# Patient Record
Sex: FEMALE | Race: BLACK OR AFRICAN AMERICAN | NOT HISPANIC OR LATINO | Employment: OTHER | RURAL
[De-identification: names, ages, dates, MRNs, and addresses within clinical notes are randomized per-mention and may not be internally consistent; named-entity substitution may affect disease eponyms.]

---

## 2020-03-24 ENCOUNTER — HISTORICAL (OUTPATIENT)
Dept: ADMINISTRATIVE | Facility: HOSPITAL | Age: 40
End: 2020-03-24

## 2020-03-24 LAB
ALBUMIN SERPL BCP-MCNC: 3.8 G/DL (ref 3.5–5)
ALP SERPL-CCNC: 94 U/L (ref 37–98)
ALT SERPL W P-5'-P-CCNC: 39 U/L (ref 13–56)
AST SERPL W P-5'-P-CCNC: 23 U/L (ref 15–37)
BASOPHILS # BLD AUTO: 0.04 X10E3/UL (ref 0–0.2)
BASOPHILS NFR BLD AUTO: 0.3 % (ref 0–1)
BILIRUB DIRECT SERPL-MCNC: 0.2 MG/DL (ref 0–0.2)
BILIRUB SERPL-MCNC: 0.5 MG/DL (ref 0–1.2)
BUN SERPL-MCNC: 9 MG/DL (ref 7–18)
CALCIUM SERPL-MCNC: 9.2 MG/DL (ref 8.5–10.1)
CHLORIDE SERPL-SCNC: 106 MMOL/L (ref 98–107)
CO2 SERPL-SCNC: 32 MMOL/L (ref 21–32)
CREAT SERPL-MCNC: 1.05 MG/DL (ref 0.5–1.02)
EOSINOPHIL # BLD AUTO: 0.32 X10E3/UL (ref 0–0.5)
EOSINOPHIL NFR BLD AUTO: 2.7 % (ref 1–4)
ERYTHROCYTE [DISTWIDTH] IN BLOOD BY AUTOMATED COUNT: 13.3 % (ref 11.5–14.5)
FLUAV AG UPPER RESP QL IA.RAPID: NEGATIVE
FLUBV AG UPPER RESP QL IA.RAPID: NEGATIVE
GLUCOSE SERPL-MCNC: 96 MG/DL (ref 74–106)
HCT VFR BLD AUTO: 46.7 % (ref 38–47)
HGB BLD-MCNC: 15.4 G/DL (ref 12–16)
IMM GRANULOCYTES # BLD AUTO: 0.09 X10E3/UL (ref 0–0.04)
IMM GRANULOCYTES NFR BLD: 0.8 % (ref 0–0.4)
LYMPHOCYTES # BLD AUTO: 2.89 X10E3/UL (ref 1–4.8)
LYMPHOCYTES NFR BLD AUTO: 24.2 % (ref 27–41)
MCH RBC QN AUTO: 28.5 PG (ref 27–31)
MCHC RBC AUTO-ENTMCNC: 33 G/DL (ref 32–36)
MCV RBC AUTO: 86.3 FL (ref 80–96)
MONOCYTES # BLD AUTO: 1.42 X10E3/UL (ref 0–0.8)
MONOCYTES NFR BLD AUTO: 11.9 % (ref 2–6)
MPC BLD CALC-MCNC: 10.3 FL (ref 9.4–12.4)
NEUTROPHILS # BLD AUTO: 7.2 X10E3/UL (ref 1.8–7.7)
NEUTROPHILS NFR BLD AUTO: 60.1 % (ref 53–65)
NRBC # BLD AUTO: 0 X10E3/UL (ref 0–0)
NRBC, AUTO (.00): 0 /100 (ref 0–0)
NT-PROBNP SERPL-MCNC: 57 PG/ML (ref 1–125)
PLATELET # BLD AUTO: 393 X10E3/UL (ref 150–400)
POTASSIUM SERPL-SCNC: 3.5 MMOL/L (ref 3.5–5.1)
PROT SERPL-MCNC: 7.6 G/DL (ref 6.4–8.2)
RAPID GROUP A STREP ANTIGEN: NEGATIVE
RBC # BLD AUTO: 5.41 X10E6/UL (ref 4.2–5.4)
SODIUM SERPL-SCNC: 141 MMOL/L (ref 136–145)
WBC # BLD AUTO: 11.96 X10E3/UL (ref 4.5–11)

## 2020-04-21 ENCOUNTER — HISTORICAL (OUTPATIENT)
Dept: ADMINISTRATIVE | Facility: HOSPITAL | Age: 40
End: 2020-04-21

## 2020-04-21 LAB
FLUAV AG UPPER RESP QL IA.RAPID: NEGATIVE
FLUBV AG UPPER RESP QL IA.RAPID: NEGATIVE
RAPID GROUP A STREP ANTIGEN: NEGATIVE

## 2020-05-04 ENCOUNTER — HISTORICAL (OUTPATIENT)
Dept: ADMINISTRATIVE | Facility: HOSPITAL | Age: 40
End: 2020-05-04

## 2020-05-04 LAB
ALBUMIN SERPL BCP-MCNC: 3.3 G/DL (ref 3.5–5)
ALP SERPL-CCNC: 100 U/L (ref 37–98)
ALT SERPL W P-5'-P-CCNC: 34 U/L (ref 13–56)
AMYLASE SERPL-CCNC: 61 U/L (ref 25–115)
AST SERPL W P-5'-P-CCNC: 40 U/L (ref 15–37)
BASOPHILS # BLD AUTO: 0.03 X10E3/UL (ref 0–0.2)
BASOPHILS NFR BLD AUTO: 0.4 % (ref 0–1)
BILIRUB DIRECT SERPL-MCNC: 0.1 MG/DL (ref 0–0.2)
BILIRUB SERPL-MCNC: 0.4 MG/DL (ref 0–1.2)
BUN SERPL-MCNC: 8 MG/DL (ref 7–18)
CALCIUM SERPL-MCNC: 8.9 MG/DL (ref 8.5–10.1)
CHLORIDE SERPL-SCNC: 102 MMOL/L (ref 98–107)
CO2 SERPL-SCNC: 30 MMOL/L (ref 21–32)
CREAT SERPL-MCNC: 1.03 MG/DL (ref 0.5–1.02)
EOSINOPHIL # BLD AUTO: 0.03 X10E3/UL (ref 0–0.5)
EOSINOPHIL NFR BLD AUTO: 0.4 % (ref 1–4)
ERYTHROCYTE [DISTWIDTH] IN BLOOD BY AUTOMATED COUNT: 13.2 % (ref 11.5–14.5)
GLUCOSE SERPL-MCNC: 105 MG/DL (ref 74–106)
HCT VFR BLD AUTO: 45.7 % (ref 38–47)
HGB BLD-MCNC: 15 G/DL (ref 12–16)
IMM GRANULOCYTES # BLD AUTO: 0.11 X10E3/UL (ref 0–0.04)
IMM GRANULOCYTES NFR BLD: 1.3 % (ref 0–0.4)
LIPASE SERPL-CCNC: 248 U/L (ref 73–393)
LYMPHOCYTES # BLD AUTO: 1.92 X10E3/UL (ref 1–4.8)
LYMPHOCYTES NFR BLD AUTO: 22.5 % (ref 27–41)
MCH RBC QN AUTO: 27.6 PG (ref 27–31)
MCHC RBC AUTO-ENTMCNC: 32.8 G/DL (ref 32–36)
MCV RBC AUTO: 84 FL (ref 80–96)
MONOCYTES # BLD AUTO: 0.9 X10E3/UL (ref 0–0.8)
MONOCYTES NFR BLD AUTO: 10.5 % (ref 2–6)
MPC BLD CALC-MCNC: 10 FL (ref 9.4–12.4)
NEUTROPHILS # BLD AUTO: 5.56 X10E3/UL (ref 1.8–7.7)
NEUTROPHILS NFR BLD AUTO: 64.9 % (ref 53–65)
NRBC # BLD AUTO: 0 X10E3/UL (ref 0–0)
NRBC, AUTO (.00): 0 /100 (ref 0–0)
PLATELET # BLD AUTO: 391 X10E3/UL (ref 150–400)
POTASSIUM SERPL-SCNC: 3.3 MMOL/L (ref 3.5–5.1)
PROT SERPL-MCNC: 7.3 G/DL (ref 6.4–8.2)
RBC # BLD AUTO: 5.44 X10E6/UL (ref 4.2–5.4)
SODIUM SERPL-SCNC: 142 MMOL/L (ref 136–145)
WBC # BLD AUTO: 8.55 X10E3/UL (ref 4.5–11)

## 2020-05-27 ENCOUNTER — HISTORICAL (OUTPATIENT)
Dept: ADMINISTRATIVE | Facility: HOSPITAL | Age: 40
End: 2020-05-27

## 2020-07-07 ENCOUNTER — HISTORICAL (OUTPATIENT)
Dept: ADMINISTRATIVE | Facility: HOSPITAL | Age: 40
End: 2020-07-07

## 2020-07-07 LAB
A1AT SERPL NEPH-MCNC: 168 MG/DL (ref 90–200)
ANION GAP SERPL CALCULATED.3IONS-SCNC: 11 MMOL/L (ref 7–16)
BASOPHILS # BLD AUTO: 0.02 X10E3/UL (ref 0–0.2)
BASOPHILS NFR BLD AUTO: 0.2 % (ref 0–1)
BUN SERPL-MCNC: 9 MG/DL (ref 7–18)
CALCIUM SERPL-MCNC: 9.6 MG/DL (ref 8.5–10.1)
CHLORIDE SERPL-SCNC: 104 MMOL/L (ref 98–107)
CO2 SERPL-SCNC: 29 MMOL/L (ref 21–32)
CREAT SERPL-MCNC: 0.89 MG/DL (ref 0.5–1.02)
D DIMER PPP FEU-MCNC: 0.29 UG/ML (ref 0–0.47)
EOSINOPHIL # BLD AUTO: 0.23 X10E3/UL (ref 0–0.5)
EOSINOPHIL NFR BLD AUTO: 2.7 % (ref 1–4)
ERYTHROCYTE [DISTWIDTH] IN BLOOD BY AUTOMATED COUNT: 13.8 % (ref 11.5–14.5)
GLUCOSE SERPL-MCNC: 86 MG/DL (ref 74–106)
HCT VFR BLD AUTO: 42.1 % (ref 38–47)
HGB BLD-MCNC: 13.9 G/DL (ref 12–16)
IMM GRANULOCYTES # BLD AUTO: 0.05 X10E3/UL (ref 0–0.04)
IMM GRANULOCYTES NFR BLD: 0.6 % (ref 0–0.4)
LYMPHOCYTES # BLD AUTO: 2.84 X10E3/UL (ref 1–4.8)
LYMPHOCYTES NFR BLD AUTO: 33.4 % (ref 27–41)
MCH RBC QN AUTO: 27.8 PG (ref 27–31)
MCHC RBC AUTO-ENTMCNC: 33 G/DL (ref 32–36)
MCV RBC AUTO: 84.2 FL (ref 80–96)
MONOCYTES # BLD AUTO: 1.26 X10E3/UL (ref 0–0.8)
MONOCYTES NFR BLD AUTO: 14.8 % (ref 2–6)
MPC BLD CALC-MCNC: 10.2 FL (ref 9.4–12.4)
NEUTROPHILS # BLD AUTO: 4.1 X10E3/UL (ref 1.8–7.7)
NEUTROPHILS NFR BLD AUTO: 48.3 % (ref 53–65)
NRBC # BLD AUTO: 0 X10E3/UL (ref 0–0)
NRBC, AUTO (.00): 0 /100 (ref 0–0)
PLATELET # BLD AUTO: 488 X10E3/UL (ref 150–400)
POTASSIUM SERPL-SCNC: 3.5 MMOL/L (ref 3.5–5.1)
RBC # BLD AUTO: 5 X10E6/UL (ref 4.2–5.4)
SODIUM SERPL-SCNC: 140 MMOL/L (ref 136–145)
TSH SERPL DL<=0.005 MIU/L-ACNC: 0.64 UIU/ML (ref 0.36–3.74)
WBC # BLD AUTO: 8.5 X10E3/UL (ref 4.5–11)

## 2020-10-20 LAB — PAP RECOMMENDATION EXT: NORMAL

## 2020-11-25 ENCOUNTER — HISTORICAL (OUTPATIENT)
Dept: ADMINISTRATIVE | Facility: HOSPITAL | Age: 40
End: 2020-11-25

## 2020-11-25 LAB
BACTERIA #/AREA URNS HPF: ABNORMAL /HPF
BILIRUB UR QL STRIP: NEGATIVE MG/DL
CLARITY UR: CLEAR
CLARITY UR: CLEAR
COLOR UR: YELLOW
COLOR UR: YELLOW
GLUCOSE UR STRIP-MCNC: NORMAL MG/DL
HCG UR QL IA.RAPID: NEGATIVE
KETONES UR STRIP-SCNC: NEGATIVE MG/DL
LEUKOCYTE ESTERASE UR QL STRIP: ABNORMAL LEU/UL
NITRITE UR QL STRIP: NEGATIVE
PH UR STRIP: 6.5 PH UNITS (ref 5–8)
PROT UR QL STRIP: NEGATIVE MG/DL
RBC # UR STRIP: NEGATIVE ERY/UL
RBC #/AREA URNS HPF: ABNORMAL /HPF (ref 0–3)
SP GR UR STRIP: 1.02 (ref 1–1.03)
SQUAMOUS #/AREA URNS LPF: ABNORMAL /LPF
UROBILINOGEN UR STRIP-ACNC: 0.2 MG/DL
WBC #/AREA URNS HPF: ABNORMAL /HPF (ref 0–5)

## 2020-12-28 ENCOUNTER — HISTORICAL (OUTPATIENT)
Dept: ADMINISTRATIVE | Facility: HOSPITAL | Age: 40
End: 2020-12-28

## 2021-04-05 ENCOUNTER — HISTORICAL (OUTPATIENT)
Dept: ADMINISTRATIVE | Facility: HOSPITAL | Age: 41
End: 2021-04-05

## 2021-04-19 ENCOUNTER — HISTORICAL (OUTPATIENT)
Dept: ADMINISTRATIVE | Facility: HOSPITAL | Age: 41
End: 2021-04-19

## 2021-04-29 RX ORDER — ONDANSETRON 4 MG/1
8 TABLET, FILM COATED ORAL
COMMUNITY
End: 2021-11-23 | Stop reason: CLARIF

## 2021-04-29 RX ORDER — NEBIVOLOL 10 MG/1
10 TABLET ORAL DAILY
COMMUNITY

## 2021-04-29 RX ORDER — HYDROCHLOROTHIAZIDE 12.5 MG/1
12.5 CAPSULE ORAL DAILY
COMMUNITY
End: 2021-11-23 | Stop reason: CLARIF

## 2021-04-29 RX ORDER — GUAIFENESIN 1200 MG/1
TABLET, EXTENDED RELEASE ORAL
COMMUNITY
End: 2021-11-23 | Stop reason: CLARIF

## 2021-04-29 RX ORDER — OXYCODONE AND ACETAMINOPHEN 7.5; 325 MG/1; MG/1
1 TABLET ORAL EVERY 8 HOURS
COMMUNITY
End: 2021-06-30 | Stop reason: SDUPTHER

## 2021-05-03 ENCOUNTER — OFFICE VISIT (OUTPATIENT)
Dept: PAIN MEDICINE | Facility: CLINIC | Age: 41
End: 2021-05-03
Payer: MEDICARE

## 2021-05-03 VITALS
RESPIRATION RATE: 16 BRPM | HEIGHT: 62 IN | BODY MASS INDEX: 37.73 KG/M2 | HEART RATE: 101 BPM | SYSTOLIC BLOOD PRESSURE: 153 MMHG | WEIGHT: 205 LBS | DIASTOLIC BLOOD PRESSURE: 109 MMHG

## 2021-05-03 DIAGNOSIS — M47.817 LUMBOSACRAL SPONDYLOSIS WITHOUT MYELOPATHY: Primary | Chronic | ICD-10-CM

## 2021-05-03 DIAGNOSIS — R10.84 GENERALIZED ABDOMINAL PAIN: Chronic | ICD-10-CM

## 2021-05-03 DIAGNOSIS — K86.1 IDIOPATHIC CHRONIC PANCREATITIS: Chronic | ICD-10-CM

## 2021-05-03 DIAGNOSIS — G89.4 CHRONIC PAIN SYNDROME: Chronic | ICD-10-CM

## 2021-05-03 PROCEDURE — 99215 OFFICE O/P EST HI 40 MIN: CPT | Mod: PBBFAC | Performed by: PHYSICIAN ASSISTANT

## 2021-05-03 PROCEDURE — 99214 PR OFFICE/OUTPT VISIT, EST, LEVL IV, 30-39 MIN: ICD-10-PCS | Mod: S$PBB,,, | Performed by: PHYSICIAN ASSISTANT

## 2021-05-03 PROCEDURE — 99215 OFFICE O/P EST HI 40 MIN: CPT | Mod: PBBFAC,,, | Performed by: PHYSICIAN ASSISTANT

## 2021-05-03 PROCEDURE — 99214 OFFICE O/P EST MOD 30 MIN: CPT | Mod: S$PBB,,, | Performed by: PHYSICIAN ASSISTANT

## 2021-05-03 PROCEDURE — 99215 HC OFFICE/OUTPT VISIT, EST, LEVL V, 40-54 MIN: ICD-10-PCS | Mod: PBBFAC,,, | Performed by: PHYSICIAN ASSISTANT

## 2021-05-03 RX ORDER — FLUCONAZOLE 150 MG/1
TABLET ORAL
COMMUNITY
Start: 2021-04-06 | End: 2021-11-23 | Stop reason: CLARIF

## 2021-05-03 RX ORDER — CYCLOBENZAPRINE HCL 10 MG
10 TABLET ORAL EVERY 8 HOURS
Qty: 90 TABLET | Refills: 1 | Status: SHIPPED | OUTPATIENT
Start: 2021-05-03 | End: 2021-06-30 | Stop reason: SDUPTHER

## 2021-05-03 RX ORDER — OXYCODONE AND ACETAMINOPHEN 7.5; 325 MG/1; MG/1
1 TABLET ORAL EVERY 8 HOURS
Qty: 90 TABLET | Refills: 0 | Status: SHIPPED | OUTPATIENT
Start: 2021-06-02 | End: 2021-06-30 | Stop reason: SDUPTHER

## 2021-05-03 RX ORDER — ONDANSETRON 4 MG/1
TABLET, ORALLY DISINTEGRATING ORAL
COMMUNITY
Start: 2021-04-19 | End: 2022-01-01

## 2021-05-03 RX ORDER — OXYCODONE AND ACETAMINOPHEN 7.5; 325 MG/1; MG/1
1 TABLET ORAL EVERY 8 HOURS
Qty: 90 TABLET | Refills: 0 | Status: SHIPPED | OUTPATIENT
Start: 2021-05-03 | End: 2021-06-02

## 2021-05-03 RX ORDER — METHYLPREDNISOLONE 4 MG/1
TABLET ORAL
COMMUNITY
Start: 2021-04-06 | End: 2021-11-23 | Stop reason: CLARIF

## 2021-05-03 RX ORDER — AZITHROMYCIN 250 MG/1
TABLET, FILM COATED ORAL
COMMUNITY
Start: 2021-04-06 | End: 2021-11-23 | Stop reason: CLARIF

## 2021-05-26 ENCOUNTER — OFFICE VISIT (OUTPATIENT)
Dept: PRIMARY CARE CLINIC | Facility: CLINIC | Age: 41
End: 2021-05-26
Payer: MEDICARE

## 2021-05-26 VITALS
WEIGHT: 206.63 LBS | HEIGHT: 65 IN | SYSTOLIC BLOOD PRESSURE: 124 MMHG | DIASTOLIC BLOOD PRESSURE: 80 MMHG | RESPIRATION RATE: 20 BRPM | HEART RATE: 100 BPM | BODY MASS INDEX: 34.43 KG/M2 | OXYGEN SATURATION: 92 % | TEMPERATURE: 99 F

## 2021-05-26 DIAGNOSIS — G43.809 OTHER MIGRAINE WITHOUT STATUS MIGRAINOSUS, NOT INTRACTABLE: ICD-10-CM

## 2021-05-26 DIAGNOSIS — M33.20 POLYMYOSITIS ASSOCIATED WITH AUTOIMMUNE DISEASE: Primary | ICD-10-CM

## 2021-05-26 DIAGNOSIS — M35.9 POLYMYOSITIS ASSOCIATED WITH AUTOIMMUNE DISEASE: Primary | ICD-10-CM

## 2021-05-26 PROCEDURE — 96372 THER/PROPH/DIAG INJ SC/IM: CPT | Mod: ,,, | Performed by: FAMILY MEDICINE

## 2021-05-26 PROCEDURE — 99213 PR OFFICE/OUTPT VISIT, EST, LEVL III, 20-29 MIN: ICD-10-PCS | Mod: 25,,, | Performed by: FAMILY MEDICINE

## 2021-05-26 PROCEDURE — 96372 PR INJECTION,THERAP/PROPH/DIAG2ST, IM OR SUBCUT: ICD-10-PCS | Mod: ,,, | Performed by: FAMILY MEDICINE

## 2021-05-26 PROCEDURE — 99213 OFFICE O/P EST LOW 20 MIN: CPT | Mod: 25,,, | Performed by: FAMILY MEDICINE

## 2021-05-26 RX ORDER — PROMETHAZINE HYDROCHLORIDE 25 MG/ML
25 INJECTION, SOLUTION INTRAMUSCULAR; INTRAVENOUS
Status: COMPLETED | OUTPATIENT
Start: 2021-05-26 | End: 2021-05-26

## 2021-05-26 RX ORDER — KETOROLAC TROMETHAMINE 30 MG/ML
60 INJECTION, SOLUTION INTRAMUSCULAR; INTRAVENOUS
Status: COMPLETED | OUTPATIENT
Start: 2021-05-26 | End: 2021-05-26

## 2021-05-26 RX ADMIN — KETOROLAC TROMETHAMINE 60 MG: 30 INJECTION, SOLUTION INTRAMUSCULAR; INTRAVENOUS at 09:05

## 2021-05-26 RX ADMIN — PROMETHAZINE HYDROCHLORIDE 25 MG: 25 INJECTION, SOLUTION INTRAMUSCULAR; INTRAVENOUS at 09:05

## 2021-06-02 ENCOUNTER — OFFICE VISIT (OUTPATIENT)
Dept: PRIMARY CARE CLINIC | Facility: CLINIC | Age: 41
End: 2021-06-02
Payer: MEDICARE

## 2021-06-02 VITALS
WEIGHT: 206 LBS | OXYGEN SATURATION: 94 % | SYSTOLIC BLOOD PRESSURE: 120 MMHG | BODY MASS INDEX: 37.91 KG/M2 | HEART RATE: 112 BPM | TEMPERATURE: 98 F | HEIGHT: 62 IN | DIASTOLIC BLOOD PRESSURE: 84 MMHG | RESPIRATION RATE: 20 BRPM

## 2021-06-02 DIAGNOSIS — G43.909 MIGRAINE WITHOUT STATUS MIGRAINOSUS, NOT INTRACTABLE, UNSPECIFIED MIGRAINE TYPE: ICD-10-CM

## 2021-06-02 DIAGNOSIS — M33.22 POLYMYOSITIS WITH MYOPATHY: Primary | ICD-10-CM

## 2021-06-02 PROCEDURE — 96372 THER/PROPH/DIAG INJ SC/IM: CPT | Mod: ,,, | Performed by: FAMILY MEDICINE

## 2021-06-02 PROCEDURE — 99213 OFFICE O/P EST LOW 20 MIN: CPT | Mod: 25,,, | Performed by: FAMILY MEDICINE

## 2021-06-02 PROCEDURE — 99213 PR OFFICE/OUTPT VISIT, EST, LEVL III, 20-29 MIN: ICD-10-PCS | Mod: 25,,, | Performed by: FAMILY MEDICINE

## 2021-06-02 PROCEDURE — 96372 PR INJECTION,THERAP/PROPH/DIAG2ST, IM OR SUBCUT: ICD-10-PCS | Mod: ,,, | Performed by: FAMILY MEDICINE

## 2021-06-02 RX ORDER — PROMETHAZINE HYDROCHLORIDE 25 MG/ML
25 INJECTION, SOLUTION INTRAMUSCULAR; INTRAVENOUS
Status: COMPLETED | OUTPATIENT
Start: 2021-06-02 | End: 2021-06-02

## 2021-06-02 RX ORDER — KETOROLAC TROMETHAMINE 30 MG/ML
60 INJECTION, SOLUTION INTRAMUSCULAR; INTRAVENOUS
Status: COMPLETED | OUTPATIENT
Start: 2021-06-02 | End: 2021-06-02

## 2021-06-02 RX ADMIN — KETOROLAC TROMETHAMINE 60 MG: 30 INJECTION, SOLUTION INTRAMUSCULAR; INTRAVENOUS at 09:06

## 2021-06-02 RX ADMIN — PROMETHAZINE HYDROCHLORIDE 25 MG: 25 INJECTION, SOLUTION INTRAMUSCULAR; INTRAVENOUS at 09:06

## 2021-06-08 ENCOUNTER — OFFICE VISIT (OUTPATIENT)
Dept: PRIMARY CARE CLINIC | Facility: CLINIC | Age: 41
End: 2021-06-08
Payer: MEDICARE

## 2021-06-08 VITALS
OXYGEN SATURATION: 96 % | TEMPERATURE: 98 F | BODY MASS INDEX: 37.91 KG/M2 | HEIGHT: 62 IN | HEART RATE: 101 BPM | SYSTOLIC BLOOD PRESSURE: 160 MMHG | WEIGHT: 206 LBS | RESPIRATION RATE: 20 BRPM | DIASTOLIC BLOOD PRESSURE: 100 MMHG

## 2021-06-08 DIAGNOSIS — G43.809 OTHER MIGRAINE WITHOUT STATUS MIGRAINOSUS, NOT INTRACTABLE: ICD-10-CM

## 2021-06-08 DIAGNOSIS — M33.20 POLYMYOSITIS: ICD-10-CM

## 2021-06-08 DIAGNOSIS — M19.90 ARTHRITIS: Primary | ICD-10-CM

## 2021-06-08 PROCEDURE — 96372 PR INJECTION,THERAP/PROPH/DIAG2ST, IM OR SUBCUT: ICD-10-PCS | Mod: ,,, | Performed by: FAMILY MEDICINE

## 2021-06-08 PROCEDURE — 96372 THER/PROPH/DIAG INJ SC/IM: CPT | Mod: ,,, | Performed by: FAMILY MEDICINE

## 2021-06-08 PROCEDURE — 99213 OFFICE O/P EST LOW 20 MIN: CPT | Mod: 25,,, | Performed by: FAMILY MEDICINE

## 2021-06-08 PROCEDURE — 99213 PR OFFICE/OUTPT VISIT, EST, LEVL III, 20-29 MIN: ICD-10-PCS | Mod: 25,,, | Performed by: FAMILY MEDICINE

## 2021-06-08 RX ORDER — KETOROLAC TROMETHAMINE 30 MG/ML
60 INJECTION, SOLUTION INTRAMUSCULAR; INTRAVENOUS
Status: COMPLETED | OUTPATIENT
Start: 2021-06-08 | End: 2021-06-08

## 2021-06-08 RX ORDER — PROMETHAZINE HYDROCHLORIDE 25 MG/ML
25 INJECTION, SOLUTION INTRAMUSCULAR; INTRAVENOUS
Status: COMPLETED | OUTPATIENT
Start: 2021-06-08 | End: 2021-06-08

## 2021-06-08 RX ADMIN — PROMETHAZINE HYDROCHLORIDE 25 MG: 25 INJECTION, SOLUTION INTRAMUSCULAR; INTRAVENOUS at 09:06

## 2021-06-08 RX ADMIN — KETOROLAC TROMETHAMINE 60 MG: 30 INJECTION, SOLUTION INTRAMUSCULAR; INTRAVENOUS at 09:06

## 2021-06-15 ENCOUNTER — OFFICE VISIT (OUTPATIENT)
Dept: PRIMARY CARE CLINIC | Facility: CLINIC | Age: 41
End: 2021-06-15
Payer: MEDICARE

## 2021-06-15 VITALS
RESPIRATION RATE: 20 BRPM | HEART RATE: 88 BPM | WEIGHT: 206 LBS | TEMPERATURE: 97 F | HEIGHT: 62 IN | OXYGEN SATURATION: 95 % | BODY MASS INDEX: 37.91 KG/M2 | DIASTOLIC BLOOD PRESSURE: 78 MMHG | SYSTOLIC BLOOD PRESSURE: 132 MMHG

## 2021-06-15 DIAGNOSIS — M33.22 POLYMYOSITIS WITH MYOPATHY: ICD-10-CM

## 2021-06-15 DIAGNOSIS — G43.909 MIGRAINE WITHOUT STATUS MIGRAINOSUS, NOT INTRACTABLE, UNSPECIFIED MIGRAINE TYPE: Primary | ICD-10-CM

## 2021-06-15 PROCEDURE — 96372 PR INJECTION,THERAP/PROPH/DIAG2ST, IM OR SUBCUT: ICD-10-PCS | Mod: ,,, | Performed by: FAMILY MEDICINE

## 2021-06-15 PROCEDURE — 99213 PR OFFICE/OUTPT VISIT, EST, LEVL III, 20-29 MIN: ICD-10-PCS | Mod: 25,,, | Performed by: FAMILY MEDICINE

## 2021-06-15 PROCEDURE — 96372 THER/PROPH/DIAG INJ SC/IM: CPT | Mod: ,,, | Performed by: FAMILY MEDICINE

## 2021-06-15 PROCEDURE — 99213 OFFICE O/P EST LOW 20 MIN: CPT | Mod: 25,,, | Performed by: FAMILY MEDICINE

## 2021-06-15 RX ORDER — PROMETHAZINE HYDROCHLORIDE 25 MG/ML
25 INJECTION, SOLUTION INTRAMUSCULAR; INTRAVENOUS
Status: COMPLETED | OUTPATIENT
Start: 2021-06-15 | End: 2021-06-15

## 2021-06-15 RX ORDER — KETOROLAC TROMETHAMINE 30 MG/ML
60 INJECTION, SOLUTION INTRAMUSCULAR; INTRAVENOUS
Status: COMPLETED | OUTPATIENT
Start: 2021-06-15 | End: 2021-06-15

## 2021-06-15 RX ADMIN — KETOROLAC TROMETHAMINE 60 MG: 30 INJECTION, SOLUTION INTRAMUSCULAR; INTRAVENOUS at 09:06

## 2021-06-15 RX ADMIN — PROMETHAZINE HYDROCHLORIDE 25 MG: 25 INJECTION, SOLUTION INTRAMUSCULAR; INTRAVENOUS at 09:06

## 2021-06-30 ENCOUNTER — OFFICE VISIT (OUTPATIENT)
Dept: PAIN MEDICINE | Facility: CLINIC | Age: 41
End: 2021-06-30
Payer: MEDICARE

## 2021-06-30 VITALS
RESPIRATION RATE: 18 BRPM | SYSTOLIC BLOOD PRESSURE: 139 MMHG | BODY MASS INDEX: 37.91 KG/M2 | HEART RATE: 101 BPM | HEIGHT: 62 IN | WEIGHT: 206 LBS | DIASTOLIC BLOOD PRESSURE: 87 MMHG

## 2021-06-30 DIAGNOSIS — K86.1 IDIOPATHIC CHRONIC PANCREATITIS: Primary | Chronic | ICD-10-CM

## 2021-06-30 DIAGNOSIS — Z79.899 ENCOUNTER FOR LONG-TERM (CURRENT) USE OF OTHER MEDICATIONS: ICD-10-CM

## 2021-06-30 DIAGNOSIS — M47.817 LUMBOSACRAL SPONDYLOSIS WITHOUT MYELOPATHY: Chronic | ICD-10-CM

## 2021-06-30 DIAGNOSIS — M33.22 POLYMYOSITIS WITH MYOPATHY: ICD-10-CM

## 2021-06-30 DIAGNOSIS — R10.84 GENERALIZED ABDOMINAL PAIN: Chronic | ICD-10-CM

## 2021-06-30 LAB
CTP QC/QA: YES
POC (AMP) AMPHETAMINE: NEGATIVE
POC (BAR) BARBITURATES: NEGATIVE
POC (BUP) BUPRENORPHINE: NEGATIVE
POC (BZO) BENZODIAZEPINES: NEGATIVE
POC (COC) COCAINE: NEGATIVE
POC (MDMA) METHYLENEDIOXYMETHAMPHETAMINE 3,4: NEGATIVE
POC (MET) METHAMPHETAMINE: NEGATIVE
POC (MOP) OPIATES: NEGATIVE
POC (MTD) METHADONE: NEGATIVE
POC (OXY) OXYCODONE: ABNORMAL
POC (PCP) PHENCYCLIDINE: NEGATIVE
POC (TCA) TRICYCLIC ANTIDEPRESSANTS: NEGATIVE
POC TEMPERATURE (URINE): 94
POC THC: NEGATIVE

## 2021-06-30 PROCEDURE — 80305 DRUG TEST PRSMV DIR OPT OBS: CPT | Mod: PBBFAC | Performed by: PHYSICIAN ASSISTANT

## 2021-06-30 PROCEDURE — 99214 PR OFFICE/OUTPT VISIT, EST, LEVL IV, 30-39 MIN: ICD-10-PCS | Mod: S$PBB,,, | Performed by: PHYSICIAN ASSISTANT

## 2021-06-30 PROCEDURE — 99214 OFFICE O/P EST MOD 30 MIN: CPT | Mod: PBBFAC | Performed by: PHYSICIAN ASSISTANT

## 2021-06-30 PROCEDURE — 99214 OFFICE O/P EST MOD 30 MIN: CPT | Mod: S$PBB,,, | Performed by: PHYSICIAN ASSISTANT

## 2021-06-30 RX ORDER — OXYCODONE AND ACETAMINOPHEN 7.5; 325 MG/1; MG/1
1 TABLET ORAL EVERY 8 HOURS
Qty: 90 TABLET | Refills: 0 | Status: SHIPPED | OUTPATIENT
Start: 2021-07-02 | End: 2021-08-01

## 2021-06-30 RX ORDER — CYCLOBENZAPRINE HCL 10 MG
10 TABLET ORAL EVERY 8 HOURS
Qty: 90 TABLET | Refills: 1 | Status: SHIPPED | OUTPATIENT
Start: 2021-06-30 | End: 2021-08-29

## 2021-06-30 RX ORDER — OXYCODONE AND ACETAMINOPHEN 7.5; 325 MG/1; MG/1
1 TABLET ORAL EVERY 8 HOURS
Qty: 90 TABLET | Refills: 0 | Status: SHIPPED | OUTPATIENT
Start: 2021-07-31 | End: 2021-08-26 | Stop reason: SDUPTHER

## 2021-07-01 ENCOUNTER — PATIENT MESSAGE (OUTPATIENT)
Dept: ADMINISTRATIVE | Facility: OTHER | Age: 41
End: 2021-07-01

## 2021-08-04 ENCOUNTER — APPOINTMENT (OUTPATIENT)
Dept: RADIOLOGY | Facility: CLINIC | Age: 41
End: 2021-08-04
Attending: FAMILY MEDICINE
Payer: MEDICARE

## 2021-08-04 ENCOUNTER — OFFICE VISIT (OUTPATIENT)
Dept: PRIMARY CARE CLINIC | Facility: CLINIC | Age: 41
End: 2021-08-04
Payer: MEDICARE

## 2021-08-04 VITALS
BODY MASS INDEX: 38.28 KG/M2 | RESPIRATION RATE: 20 BRPM | OXYGEN SATURATION: 90 % | HEIGHT: 62 IN | WEIGHT: 208 LBS | SYSTOLIC BLOOD PRESSURE: 140 MMHG | HEART RATE: 112 BPM | TEMPERATURE: 98 F | DIASTOLIC BLOOD PRESSURE: 90 MMHG

## 2021-08-04 DIAGNOSIS — M33.20 POLYMYOSITIS ASSOCIATED WITH AUTOIMMUNE DISEASE: ICD-10-CM

## 2021-08-04 DIAGNOSIS — R05.9 COUGH: ICD-10-CM

## 2021-08-04 DIAGNOSIS — M35.9 POLYMYOSITIS ASSOCIATED WITH AUTOIMMUNE DISEASE: ICD-10-CM

## 2021-08-04 DIAGNOSIS — J84.10 DIFFUSE IDIOPATHIC PULMONARY FIBROSIS: Primary | ICD-10-CM

## 2021-08-04 DIAGNOSIS — M33.22 POLYMYOSITIS WITH MYOPATHY: ICD-10-CM

## 2021-08-04 PROCEDURE — 71046 X-RAY EXAM CHEST 2 VIEWS: CPT | Mod: TC,RHCUB | Performed by: FAMILY MEDICINE

## 2021-08-04 PROCEDURE — 71046 XR CHEST PA AND LATERAL: ICD-10-PCS | Mod: 26,,, | Performed by: RADIOLOGY

## 2021-08-04 PROCEDURE — 71046 X-RAY EXAM CHEST 2 VIEWS: CPT | Mod: 26,,, | Performed by: RADIOLOGY

## 2021-08-04 PROCEDURE — 99213 OFFICE O/P EST LOW 20 MIN: CPT | Mod: ,,, | Performed by: FAMILY MEDICINE

## 2021-08-04 PROCEDURE — 99213 PR OFFICE/OUTPT VISIT, EST, LEVL III, 20-29 MIN: ICD-10-PCS | Mod: ,,, | Performed by: FAMILY MEDICINE

## 2021-08-04 RX ORDER — METHYLPREDNISOLONE 4 MG/1
TABLET ORAL
Qty: 1 PACKAGE | Refills: 0 | Status: SHIPPED | OUTPATIENT
Start: 2021-08-04 | End: 2021-08-25

## 2021-08-24 ENCOUNTER — OFFICE VISIT (OUTPATIENT)
Dept: PRIMARY CARE CLINIC | Facility: CLINIC | Age: 41
End: 2021-08-24
Payer: MEDICARE

## 2021-08-24 VITALS
SYSTOLIC BLOOD PRESSURE: 126 MMHG | TEMPERATURE: 99 F | DIASTOLIC BLOOD PRESSURE: 82 MMHG | HEIGHT: 62 IN | OXYGEN SATURATION: 95 % | HEART RATE: 100 BPM | WEIGHT: 206 LBS | BODY MASS INDEX: 37.91 KG/M2 | RESPIRATION RATE: 20 BRPM

## 2021-08-24 DIAGNOSIS — M19.90 ARTHRITIS: Primary | ICD-10-CM

## 2021-08-24 DIAGNOSIS — M33.20 POLYMYOSITIS ASSOCIATED WITH AUTOIMMUNE DISEASE: ICD-10-CM

## 2021-08-24 DIAGNOSIS — M43.6 TORTICOLLIS, ACUTE: ICD-10-CM

## 2021-08-24 DIAGNOSIS — M35.9 POLYMYOSITIS ASSOCIATED WITH AUTOIMMUNE DISEASE: ICD-10-CM

## 2021-08-24 PROCEDURE — 96372 THER/PROPH/DIAG INJ SC/IM: CPT | Mod: ,,, | Performed by: FAMILY MEDICINE

## 2021-08-24 PROCEDURE — 96372 PR INJECTION,THERAP/PROPH/DIAG2ST, IM OR SUBCUT: ICD-10-PCS | Mod: ,,, | Performed by: FAMILY MEDICINE

## 2021-08-24 PROCEDURE — 99213 OFFICE O/P EST LOW 20 MIN: CPT | Mod: 25,,, | Performed by: FAMILY MEDICINE

## 2021-08-24 PROCEDURE — 99213 PR OFFICE/OUTPT VISIT, EST, LEVL III, 20-29 MIN: ICD-10-PCS | Mod: 25,,, | Performed by: FAMILY MEDICINE

## 2021-08-24 RX ORDER — LIDOCAINE 50 MG/G
1 PATCH TOPICAL DAILY
Qty: 30 PATCH | Refills: 2 | Status: SHIPPED | OUTPATIENT
Start: 2021-08-24 | End: 2021-11-23 | Stop reason: CLARIF

## 2021-08-24 RX ORDER — ORPHENADRINE CITRATE 30 MG/ML
60 INJECTION INTRAMUSCULAR; INTRAVENOUS
Status: COMPLETED | OUTPATIENT
Start: 2021-08-24 | End: 2021-08-24

## 2021-08-24 RX ORDER — KETOROLAC TROMETHAMINE 30 MG/ML
60 INJECTION, SOLUTION INTRAMUSCULAR; INTRAVENOUS
Status: COMPLETED | OUTPATIENT
Start: 2021-08-24 | End: 2021-08-24

## 2021-08-24 RX ORDER — METHOCARBAMOL 500 MG/1
500 TABLET, FILM COATED ORAL 4 TIMES DAILY
Qty: 40 TABLET | Refills: 0 | Status: SHIPPED | OUTPATIENT
Start: 2021-08-24 | End: 2021-09-03

## 2021-08-24 RX ADMIN — KETOROLAC TROMETHAMINE 60 MG: 30 INJECTION, SOLUTION INTRAMUSCULAR; INTRAVENOUS at 11:08

## 2021-08-24 RX ADMIN — ORPHENADRINE CITRATE 60 MG: 30 INJECTION INTRAMUSCULAR; INTRAVENOUS at 11:08

## 2021-08-30 ENCOUNTER — OFFICE VISIT (OUTPATIENT)
Dept: PAIN MEDICINE | Facility: CLINIC | Age: 41
End: 2021-08-30
Payer: MEDICARE

## 2021-08-30 VITALS
HEART RATE: 110 BPM | RESPIRATION RATE: 18 BRPM | BODY MASS INDEX: 38.42 KG/M2 | DIASTOLIC BLOOD PRESSURE: 94 MMHG | HEIGHT: 62 IN | WEIGHT: 208.81 LBS | SYSTOLIC BLOOD PRESSURE: 173 MMHG

## 2021-08-30 DIAGNOSIS — R10.84 GENERALIZED ABDOMINAL PAIN: Chronic | ICD-10-CM

## 2021-08-30 DIAGNOSIS — M47.817 LUMBOSACRAL SPONDYLOSIS WITHOUT MYELOPATHY: Chronic | ICD-10-CM

## 2021-08-30 DIAGNOSIS — M33.22 POLYMYOSITIS WITH MYOPATHY: Primary | Chronic | ICD-10-CM

## 2021-08-30 PROCEDURE — 99215 OFFICE O/P EST HI 40 MIN: CPT | Mod: PBBFAC | Performed by: PHYSICIAN ASSISTANT

## 2021-08-30 PROCEDURE — 99214 OFFICE O/P EST MOD 30 MIN: CPT | Mod: S$PBB,,, | Performed by: PHYSICIAN ASSISTANT

## 2021-08-30 PROCEDURE — 99214 PR OFFICE/OUTPT VISIT, EST, LEVL IV, 30-39 MIN: ICD-10-PCS | Mod: S$PBB,,, | Performed by: PHYSICIAN ASSISTANT

## 2021-08-30 RX ORDER — OXYCODONE AND ACETAMINOPHEN 7.5; 325 MG/1; MG/1
1 TABLET ORAL EVERY 8 HOURS
Qty: 90 TABLET | Refills: 0 | Status: SHIPPED | OUTPATIENT
Start: 2021-08-30 | End: 2021-09-23 | Stop reason: SDUPTHER

## 2021-08-31 ENCOUNTER — TELEPHONE (OUTPATIENT)
Dept: PRIMARY CARE CLINIC | Facility: CLINIC | Age: 41
End: 2021-08-31

## 2021-09-27 ENCOUNTER — OFFICE VISIT (OUTPATIENT)
Dept: PAIN MEDICINE | Facility: CLINIC | Age: 41
End: 2021-09-27
Payer: MEDICARE

## 2021-09-27 VITALS
HEIGHT: 62 IN | SYSTOLIC BLOOD PRESSURE: 177 MMHG | HEART RATE: 90 BPM | WEIGHT: 208.81 LBS | RESPIRATION RATE: 18 BRPM | BODY MASS INDEX: 38.42 KG/M2 | DIASTOLIC BLOOD PRESSURE: 90 MMHG

## 2021-09-27 DIAGNOSIS — K86.1 IDIOPATHIC CHRONIC PANCREATITIS: Chronic | ICD-10-CM

## 2021-09-27 DIAGNOSIS — M47.817 LUMBOSACRAL SPONDYLOSIS WITHOUT MYELOPATHY: Primary | Chronic | ICD-10-CM

## 2021-09-27 DIAGNOSIS — M33.22 POLYMYOSITIS WITH MYOPATHY: Chronic | ICD-10-CM

## 2021-09-27 DIAGNOSIS — Z79.899 ENCOUNTER FOR LONG-TERM (CURRENT) USE OF OTHER MEDICATIONS: ICD-10-CM

## 2021-09-27 DIAGNOSIS — R10.84 GENERALIZED ABDOMINAL PAIN: Chronic | ICD-10-CM

## 2021-09-27 LAB
CTP QC/QA: YES
POC (AMP) AMPHETAMINE: NEGATIVE
POC (BAR) BARBITURATES: NEGATIVE
POC (BUP) BUPRENORPHINE: NEGATIVE
POC (BZO) BENZODIAZEPINES: NEGATIVE
POC (COC) COCAINE: NEGATIVE
POC (MDMA) METHYLENEDIOXYMETHAMPHETAMINE 3,4: NEGATIVE
POC (MET) METHAMPHETAMINE: NEGATIVE
POC (MOP) OPIATES: ABNORMAL
POC (MTD) METHADONE: NEGATIVE
POC (OXY) OXYCODONE: NEGATIVE
POC (PCP) PHENCYCLIDINE: NEGATIVE
POC (TCA) TRICYCLIC ANTIDEPRESSANTS: NEGATIVE
POC TEMPERATURE (URINE): 94
POC THC: NEGATIVE

## 2021-09-27 PROCEDURE — 99214 OFFICE O/P EST MOD 30 MIN: CPT | Mod: S$PBB,,, | Performed by: PHYSICIAN ASSISTANT

## 2021-09-27 PROCEDURE — 99214 OFFICE O/P EST MOD 30 MIN: CPT | Mod: PBBFAC | Performed by: PHYSICIAN ASSISTANT

## 2021-09-27 PROCEDURE — 99214 PR OFFICE/OUTPT VISIT, EST, LEVL IV, 30-39 MIN: ICD-10-PCS | Mod: S$PBB,,, | Performed by: PHYSICIAN ASSISTANT

## 2021-09-27 PROCEDURE — 80305 DRUG TEST PRSMV DIR OPT OBS: CPT | Mod: PBBFAC | Performed by: PHYSICIAN ASSISTANT

## 2021-09-27 RX ORDER — OXYCODONE AND ACETAMINOPHEN 7.5; 325 MG/1; MG/1
1 TABLET ORAL EVERY 8 HOURS
Qty: 90 TABLET | Refills: 0 | Status: SHIPPED | OUTPATIENT
Start: 2021-09-29 | End: 2021-10-29

## 2021-09-27 RX ORDER — OXYCODONE AND ACETAMINOPHEN 7.5; 325 MG/1; MG/1
1 TABLET ORAL EVERY 8 HOURS
Qty: 90 TABLET | Refills: 0 | Status: SHIPPED | OUTPATIENT
Start: 2021-10-29 | End: 2021-11-11 | Stop reason: SDUPTHER

## 2021-11-11 RX ORDER — OXYCODONE AND ACETAMINOPHEN 7.5; 325 MG/1; MG/1
1 TABLET ORAL EVERY 8 HOURS
Qty: 90 TABLET | Refills: 0 | Status: CANCELLED | OUTPATIENT
Start: 2021-11-11 | End: 2021-12-11

## 2021-11-15 ENCOUNTER — OFFICE VISIT (OUTPATIENT)
Dept: PAIN MEDICINE | Facility: CLINIC | Age: 41
End: 2021-11-15
Payer: MEDICARE

## 2021-11-15 VITALS
RESPIRATION RATE: 18 BRPM | SYSTOLIC BLOOD PRESSURE: 162 MMHG | WEIGHT: 207 LBS | BODY MASS INDEX: 38.09 KG/M2 | DIASTOLIC BLOOD PRESSURE: 102 MMHG | HEART RATE: 109 BPM | HEIGHT: 62 IN

## 2021-11-15 DIAGNOSIS — M33.22 POLYMYOSITIS WITH MYOPATHY: Chronic | ICD-10-CM

## 2021-11-15 DIAGNOSIS — K86.1 IDIOPATHIC CHRONIC PANCREATITIS: Chronic | ICD-10-CM

## 2021-11-15 DIAGNOSIS — R10.84 GENERALIZED ABDOMINAL PAIN: Chronic | ICD-10-CM

## 2021-11-15 DIAGNOSIS — M47.817 LUMBOSACRAL SPONDYLOSIS WITHOUT MYELOPATHY: Primary | Chronic | ICD-10-CM

## 2021-11-15 PROCEDURE — 99214 OFFICE O/P EST MOD 30 MIN: CPT | Mod: S$PBB,,, | Performed by: PHYSICIAN ASSISTANT

## 2021-11-15 PROCEDURE — 99214 OFFICE O/P EST MOD 30 MIN: CPT | Mod: PBBFAC | Performed by: PHYSICIAN ASSISTANT

## 2021-11-15 PROCEDURE — 99214 PR OFFICE/OUTPT VISIT, EST, LEVL IV, 30-39 MIN: ICD-10-PCS | Mod: S$PBB,,, | Performed by: PHYSICIAN ASSISTANT

## 2021-11-15 RX ORDER — OXYCODONE AND ACETAMINOPHEN 7.5; 325 MG/1; MG/1
1 TABLET ORAL EVERY 8 HOURS
Qty: 90 TABLET | Refills: 0 | Status: SHIPPED | OUTPATIENT
Start: 2021-11-26 | End: 2021-12-16 | Stop reason: SDUPTHER

## 2021-11-23 ENCOUNTER — HOSPITAL ENCOUNTER (EMERGENCY)
Facility: HOSPITAL | Age: 41
Discharge: HOME | End: 2021-11-23
Attending: EMERGENCY MEDICINE
Payer: MEDICARE

## 2021-11-23 VITALS
TEMPERATURE: 99 F | WEIGHT: 207 LBS | OXYGEN SATURATION: 97 % | RESPIRATION RATE: 20 BRPM | SYSTOLIC BLOOD PRESSURE: 154 MMHG | HEART RATE: 93 BPM | BODY MASS INDEX: 38.09 KG/M2 | DIASTOLIC BLOOD PRESSURE: 102 MMHG | HEIGHT: 62 IN

## 2021-11-23 DIAGNOSIS — R07.89 CHEST WALL PAIN FOLLOWING SURGERY: ICD-10-CM

## 2021-11-23 DIAGNOSIS — R00.2 PALPITATIONS: ICD-10-CM

## 2021-11-23 DIAGNOSIS — F41.9 ANXIETY: Primary | ICD-10-CM

## 2021-11-23 DIAGNOSIS — G89.18 CHEST WALL PAIN FOLLOWING SURGERY: ICD-10-CM

## 2021-11-23 LAB
ALBUMIN SERPL BCP-MCNC: 3.5 G/DL (ref 3.5–5)
ALBUMIN/GLOB SERPL: 0.8 {RATIO}
ALP SERPL-CCNC: 90 U/L (ref 37–98)
ALT SERPL W P-5'-P-CCNC: 30 U/L (ref 13–56)
ANION GAP SERPL CALCULATED.3IONS-SCNC: 17 MMOL/L (ref 7–16)
AST SERPL W P-5'-P-CCNC: 19 U/L (ref 15–37)
BASOPHILS # BLD AUTO: 0.02 K/UL (ref 0–0.2)
BASOPHILS NFR BLD AUTO: 0.2 % (ref 0–1)
BILIRUB SERPL-MCNC: 0.3 MG/DL (ref 0–1.2)
BUN SERPL-MCNC: 14 MG/DL (ref 7–18)
BUN/CREAT SERPL: 13 (ref 6–20)
CALCIUM SERPL-MCNC: 9.3 MG/DL (ref 8.5–10.1)
CHLORIDE SERPL-SCNC: 106 MMOL/L (ref 98–107)
CO2 SERPL-SCNC: 26 MMOL/L (ref 21–32)
CREAT SERPL-MCNC: 1.12 MG/DL (ref 0.55–1.02)
DIFFERENTIAL METHOD BLD: ABNORMAL
EOSINOPHIL # BLD AUTO: 0.41 K/UL (ref 0–0.5)
EOSINOPHIL NFR BLD AUTO: 4.4 % (ref 1–4)
ERYTHROCYTE [DISTWIDTH] IN BLOOD BY AUTOMATED COUNT: 14.7 % (ref 11.5–14.5)
GLOBULIN SER-MCNC: 4.2 G/DL (ref 2–4)
GLUCOSE SERPL-MCNC: 96 MG/DL (ref 74–106)
HCT VFR BLD AUTO: 43.9 % (ref 38–47)
HGB BLD-MCNC: 14.1 G/DL (ref 12–16)
IMM GRANULOCYTES # BLD AUTO: 0.05 K/UL (ref 0–0.04)
IMM GRANULOCYTES NFR BLD: 0.5 % (ref 0–0.4)
LYMPHOCYTES # BLD AUTO: 3.28 K/UL (ref 1–4.8)
LYMPHOCYTES NFR BLD AUTO: 34.8 % (ref 27–41)
MAGNESIUM SERPL-MCNC: 1.9 MG/DL (ref 1.7–2.3)
MCH RBC QN AUTO: 26.8 PG (ref 27–31)
MCHC RBC AUTO-ENTMCNC: 32.1 G/DL (ref 32–36)
MCV RBC AUTO: 83.3 FL (ref 80–96)
MONOCYTES # BLD AUTO: 1.5 K/UL (ref 0–0.8)
MONOCYTES NFR BLD AUTO: 15.9 % (ref 2–6)
MPC BLD CALC-MCNC: 9.9 FL (ref 9.4–12.4)
NEUTROPHILS # BLD AUTO: 4.16 K/UL (ref 1.8–7.7)
NEUTROPHILS NFR BLD AUTO: 44.2 % (ref 53–65)
PLATELET # BLD AUTO: 512 K/UL (ref 150–400)
POTASSIUM SERPL-SCNC: 3.6 MMOL/L (ref 3.5–5.1)
PROT SERPL-MCNC: 7.7 G/DL (ref 6.4–8.2)
RBC # BLD AUTO: 5.27 M/UL (ref 4.2–5.4)
SODIUM SERPL-SCNC: 145 MMOL/L (ref 136–145)
TROPONIN I SERPL HS-MCNC: 6.9 PG/ML
WBC # BLD AUTO: 9.42 K/UL (ref 4.5–11)

## 2021-11-23 PROCEDURE — 85025 COMPLETE CBC W/AUTO DIFF WBC: CPT | Performed by: EMERGENCY MEDICINE

## 2021-11-23 PROCEDURE — 93010 ELECTROCARDIOGRAM REPORT: CPT | Mod: ,,, | Performed by: EMERGENCY MEDICINE

## 2021-11-23 PROCEDURE — 96374 THER/PROPH/DIAG INJ IV PUSH: CPT

## 2021-11-23 PROCEDURE — 93010 EKG 12-LEAD: ICD-10-PCS | Mod: ,,, | Performed by: EMERGENCY MEDICINE

## 2021-11-23 PROCEDURE — 99283 EMERGENCY DEPT VISIT LOW MDM: CPT | Performed by: EMERGENCY MEDICINE

## 2021-11-23 PROCEDURE — 83735 ASSAY OF MAGNESIUM: CPT | Performed by: EMERGENCY MEDICINE

## 2021-11-23 PROCEDURE — 36415 COLL VENOUS BLD VENIPUNCTURE: CPT | Performed by: EMERGENCY MEDICINE

## 2021-11-23 PROCEDURE — 63600175 PHARM REV CODE 636 W HCPCS: Performed by: EMERGENCY MEDICINE

## 2021-11-23 PROCEDURE — 99285 EMERGENCY DEPT VISIT HI MDM: CPT | Mod: 25

## 2021-11-23 PROCEDURE — 84484 ASSAY OF TROPONIN QUANT: CPT | Performed by: EMERGENCY MEDICINE

## 2021-11-23 PROCEDURE — 80053 COMPREHEN METABOLIC PANEL: CPT | Performed by: EMERGENCY MEDICINE

## 2021-11-23 PROCEDURE — 93005 ELECTROCARDIOGRAM TRACING: CPT

## 2021-11-23 RX ORDER — AMLODIPINE BESYLATE 10 MG/1
10 TABLET ORAL DAILY
COMMUNITY

## 2021-11-23 RX ORDER — KETOROLAC TROMETHAMINE 15 MG/ML
15 INJECTION, SOLUTION INTRAMUSCULAR; INTRAVENOUS
Status: COMPLETED | OUTPATIENT
Start: 2021-11-23 | End: 2021-11-23

## 2021-11-23 RX ADMIN — KETOROLAC TROMETHAMINE 15 MG: 15 INJECTION, SOLUTION INTRAMUSCULAR; INTRAVENOUS at 06:11

## 2021-11-24 ENCOUNTER — TELEPHONE (OUTPATIENT)
Dept: EMERGENCY MEDICINE | Facility: HOSPITAL | Age: 41
End: 2021-11-24
Payer: MEDICARE

## 2021-12-01 ENCOUNTER — OFFICE VISIT (OUTPATIENT)
Dept: PRIMARY CARE CLINIC | Facility: CLINIC | Age: 41
End: 2021-12-01
Payer: MEDICARE

## 2021-12-01 VITALS
RESPIRATION RATE: 20 BRPM | DIASTOLIC BLOOD PRESSURE: 100 MMHG | WEIGHT: 205 LBS | SYSTOLIC BLOOD PRESSURE: 160 MMHG | HEIGHT: 62 IN | BODY MASS INDEX: 37.73 KG/M2 | HEART RATE: 100 BPM | TEMPERATURE: 99 F | OXYGEN SATURATION: 94 %

## 2021-12-01 DIAGNOSIS — M33.20 POLYMYOSITIS ASSOCIATED WITH AUTOIMMUNE DISEASE: Primary | ICD-10-CM

## 2021-12-01 DIAGNOSIS — M35.9 POLYMYOSITIS ASSOCIATED WITH AUTOIMMUNE DISEASE: Primary | ICD-10-CM

## 2021-12-01 DIAGNOSIS — G44.40 DRUG-INDUCED HEADACHE, NOT ELSEWHERE CLASSIFIED, NOT INTRACTABLE: ICD-10-CM

## 2021-12-01 PROCEDURE — 99213 PR OFFICE/OUTPT VISIT, EST, LEVL III, 20-29 MIN: ICD-10-PCS | Mod: 25,,, | Performed by: FAMILY MEDICINE

## 2021-12-01 PROCEDURE — 96372 THER/PROPH/DIAG INJ SC/IM: CPT | Mod: ,,, | Performed by: FAMILY MEDICINE

## 2021-12-01 PROCEDURE — 96372 PR INJECTION,THERAP/PROPH/DIAG2ST, IM OR SUBCUT: ICD-10-PCS | Mod: ,,, | Performed by: FAMILY MEDICINE

## 2021-12-01 PROCEDURE — 99213 OFFICE O/P EST LOW 20 MIN: CPT | Mod: 25,,, | Performed by: FAMILY MEDICINE

## 2021-12-01 RX ORDER — PROMETHAZINE HYDROCHLORIDE 25 MG/ML
25 INJECTION, SOLUTION INTRAMUSCULAR; INTRAVENOUS
Status: COMPLETED | OUTPATIENT
Start: 2021-12-01 | End: 2021-12-01

## 2021-12-01 RX ORDER — KETOROLAC TROMETHAMINE 30 MG/ML
60 INJECTION, SOLUTION INTRAMUSCULAR; INTRAVENOUS
Status: COMPLETED | OUTPATIENT
Start: 2021-12-01 | End: 2021-12-01

## 2021-12-01 RX ADMIN — PROMETHAZINE HYDROCHLORIDE 25 MG: 25 INJECTION, SOLUTION INTRAMUSCULAR; INTRAVENOUS at 09:12

## 2021-12-01 RX ADMIN — KETOROLAC TROMETHAMINE 60 MG: 30 INJECTION, SOLUTION INTRAMUSCULAR; INTRAVENOUS at 09:12

## 2021-12-08 ENCOUNTER — OFFICE VISIT (OUTPATIENT)
Dept: PRIMARY CARE CLINIC | Facility: CLINIC | Age: 41
End: 2021-12-08
Payer: MEDICARE

## 2021-12-08 VITALS
HEART RATE: 105 BPM | WEIGHT: 210 LBS | RESPIRATION RATE: 20 BRPM | DIASTOLIC BLOOD PRESSURE: 78 MMHG | TEMPERATURE: 98 F | BODY MASS INDEX: 38.64 KG/M2 | OXYGEN SATURATION: 96 % | HEIGHT: 62 IN | SYSTOLIC BLOOD PRESSURE: 120 MMHG

## 2021-12-08 DIAGNOSIS — M33.20 POLYMYOSITIS: Primary | ICD-10-CM

## 2021-12-08 DIAGNOSIS — G44.40 DRUG-INDUCED HEADACHE, NOT ELSEWHERE CLASSIFIED, NOT INTRACTABLE: ICD-10-CM

## 2021-12-08 PROCEDURE — 99213 OFFICE O/P EST LOW 20 MIN: CPT | Mod: 25,,, | Performed by: FAMILY MEDICINE

## 2021-12-08 PROCEDURE — 96372 THER/PROPH/DIAG INJ SC/IM: CPT | Mod: ,,, | Performed by: FAMILY MEDICINE

## 2021-12-08 PROCEDURE — 99213 PR OFFICE/OUTPT VISIT, EST, LEVL III, 20-29 MIN: ICD-10-PCS | Mod: 25,,, | Performed by: FAMILY MEDICINE

## 2021-12-08 PROCEDURE — 96372 PR INJECTION,THERAP/PROPH/DIAG2ST, IM OR SUBCUT: ICD-10-PCS | Mod: ,,, | Performed by: FAMILY MEDICINE

## 2021-12-08 RX ORDER — KETOROLAC TROMETHAMINE 30 MG/ML
60 INJECTION, SOLUTION INTRAMUSCULAR; INTRAVENOUS
Status: COMPLETED | OUTPATIENT
Start: 2021-12-08 | End: 2021-12-08

## 2021-12-08 RX ORDER — PROMETHAZINE HYDROCHLORIDE 25 MG/ML
25 INJECTION, SOLUTION INTRAMUSCULAR; INTRAVENOUS
Status: COMPLETED | OUTPATIENT
Start: 2021-12-08 | End: 2021-12-08

## 2021-12-08 RX ADMIN — PROMETHAZINE HYDROCHLORIDE 25 MG: 25 INJECTION, SOLUTION INTRAMUSCULAR; INTRAVENOUS at 09:12

## 2021-12-08 RX ADMIN — KETOROLAC TROMETHAMINE 60 MG: 30 INJECTION, SOLUTION INTRAMUSCULAR; INTRAVENOUS at 09:12

## 2021-12-15 ENCOUNTER — OFFICE VISIT (OUTPATIENT)
Dept: PRIMARY CARE CLINIC | Facility: CLINIC | Age: 41
End: 2021-12-15
Payer: MEDICARE

## 2021-12-15 VITALS
BODY MASS INDEX: 38.09 KG/M2 | TEMPERATURE: 98 F | SYSTOLIC BLOOD PRESSURE: 122 MMHG | WEIGHT: 207 LBS | HEART RATE: 116 BPM | RESPIRATION RATE: 20 BRPM | HEIGHT: 62 IN | DIASTOLIC BLOOD PRESSURE: 80 MMHG | OXYGEN SATURATION: 99 %

## 2021-12-15 DIAGNOSIS — M35.9 POLYMYOSITIS ASSOCIATED WITH AUTOIMMUNE DISEASE: ICD-10-CM

## 2021-12-15 DIAGNOSIS — M33.20 POLYMYOSITIS ASSOCIATED WITH AUTOIMMUNE DISEASE: ICD-10-CM

## 2021-12-15 DIAGNOSIS — A15.0 TB (PULMONARY TUBERCULOSIS): ICD-10-CM

## 2021-12-15 DIAGNOSIS — G44.40 DRUG-INDUCED HEADACHE, NOT ELSEWHERE CLASSIFIED, NOT INTRACTABLE: ICD-10-CM

## 2021-12-15 DIAGNOSIS — M19.90 ARTHRITIS: Primary | ICD-10-CM

## 2021-12-15 LAB
ALBUMIN SERPL BCP-MCNC: 3.3 G/DL (ref 3.5–5)
ALBUMIN/GLOB SERPL: 0.8 {RATIO}
ALP SERPL-CCNC: 91 U/L (ref 37–98)
ALT SERPL W P-5'-P-CCNC: 46 U/L (ref 13–56)
ANION GAP SERPL CALCULATED.3IONS-SCNC: 9 MMOL/L (ref 7–16)
AST SERPL W P-5'-P-CCNC: 22 U/L (ref 15–37)
BASOPHILS # BLD AUTO: 0.03 K/UL (ref 0–0.2)
BASOPHILS NFR BLD AUTO: 0.3 % (ref 0–1)
BILIRUB SERPL-MCNC: 0.6 MG/DL (ref 0–1.2)
BUN SERPL-MCNC: 14 MG/DL (ref 7–18)
BUN/CREAT SERPL: 14 (ref 6–20)
CALCIUM SERPL-MCNC: 9.3 MG/DL (ref 8.5–10.1)
CHLORIDE SERPL-SCNC: 108 MMOL/L (ref 98–107)
CO2 SERPL-SCNC: 27 MMOL/L (ref 21–32)
CREAT SERPL-MCNC: 1.03 MG/DL (ref 0.55–1.02)
DIFFERENTIAL METHOD BLD: ABNORMAL
EOSINOPHIL # BLD AUTO: 0.15 K/UL (ref 0–0.5)
EOSINOPHIL NFR BLD AUTO: 1.3 % (ref 1–4)
ERYTHROCYTE [DISTWIDTH] IN BLOOD BY AUTOMATED COUNT: 15.2 % (ref 11.5–14.5)
GLOBULIN SER-MCNC: 4.1 G/DL (ref 2–4)
GLUCOSE SERPL-MCNC: 88 MG/DL (ref 74–106)
HCT VFR BLD AUTO: 42.7 % (ref 38–47)
HGB BLD-MCNC: 13.7 G/DL (ref 12–16)
IMM GRANULOCYTES # BLD AUTO: 0.08 K/UL (ref 0–0.04)
IMM GRANULOCYTES NFR BLD: 0.7 % (ref 0–0.4)
LYMPHOCYTES # BLD AUTO: 1.84 K/UL (ref 1–4.8)
LYMPHOCYTES NFR BLD AUTO: 16.1 % (ref 27–41)
MCH RBC QN AUTO: 27.7 PG (ref 27–31)
MCHC RBC AUTO-ENTMCNC: 32.1 G/DL (ref 32–36)
MCV RBC AUTO: 86.4 FL (ref 80–96)
MONOCYTES # BLD AUTO: 1.24 K/UL (ref 0–0.8)
MONOCYTES NFR BLD AUTO: 10.9 % (ref 2–6)
MPC BLD CALC-MCNC: 10.2 FL (ref 9.4–12.4)
NEUTROPHILS # BLD AUTO: 8.08 K/UL (ref 1.8–7.7)
NEUTROPHILS NFR BLD AUTO: 70.7 % (ref 53–65)
NRBC # BLD AUTO: 0 X10E3/UL
NRBC, AUTO (.00): 0 %
PLATELET # BLD AUTO: 360 K/UL (ref 150–400)
POTASSIUM SERPL-SCNC: 4 MMOL/L (ref 3.5–5.1)
PROT SERPL-MCNC: 7.4 G/DL (ref 6.4–8.2)
RBC # BLD AUTO: 4.94 M/UL (ref 4.2–5.4)
SODIUM SERPL-SCNC: 140 MMOL/L (ref 136–145)
WBC # BLD AUTO: 11.42 K/UL (ref 4.5–11)

## 2021-12-15 PROCEDURE — 96372 THER/PROPH/DIAG INJ SC/IM: CPT | Mod: ,,, | Performed by: FAMILY MEDICINE

## 2021-12-15 PROCEDURE — 85025 COMPLETE CBC W/AUTO DIFF WBC: CPT | Mod: ,,, | Performed by: CLINICAL MEDICAL LABORATORY

## 2021-12-15 PROCEDURE — 96372 PR INJECTION,THERAP/PROPH/DIAG2ST, IM OR SUBCUT: ICD-10-PCS | Mod: ,,, | Performed by: FAMILY MEDICINE

## 2021-12-15 PROCEDURE — 80053 COMPREHENSIVE METABOLIC PANEL: ICD-10-PCS | Mod: ,,, | Performed by: CLINICAL MEDICAL LABORATORY

## 2021-12-15 PROCEDURE — 80053 COMPREHEN METABOLIC PANEL: CPT | Mod: ,,, | Performed by: CLINICAL MEDICAL LABORATORY

## 2021-12-15 PROCEDURE — 99213 OFFICE O/P EST LOW 20 MIN: CPT | Mod: 25,,, | Performed by: FAMILY MEDICINE

## 2021-12-15 PROCEDURE — 99213 PR OFFICE/OUTPT VISIT, EST, LEVL III, 20-29 MIN: ICD-10-PCS | Mod: 25,,, | Performed by: FAMILY MEDICINE

## 2021-12-15 PROCEDURE — 85025 CBC WITH DIFFERENTIAL: ICD-10-PCS | Mod: ,,, | Performed by: CLINICAL MEDICAL LABORATORY

## 2021-12-15 RX ORDER — RIFAMPIN 300 MG/1
600 CAPSULE ORAL DAILY
COMMUNITY

## 2021-12-15 RX ORDER — KETOROLAC TROMETHAMINE 30 MG/ML
60 INJECTION, SOLUTION INTRAMUSCULAR; INTRAVENOUS
Status: COMPLETED | OUTPATIENT
Start: 2021-12-15 | End: 2021-12-15

## 2021-12-15 RX ORDER — PROMETHAZINE HYDROCHLORIDE 25 MG/ML
25 INJECTION, SOLUTION INTRAMUSCULAR; INTRAVENOUS
Status: COMPLETED | OUTPATIENT
Start: 2021-12-15 | End: 2021-12-15

## 2021-12-15 RX ADMIN — PROMETHAZINE HYDROCHLORIDE 25 MG: 25 INJECTION, SOLUTION INTRAMUSCULAR; INTRAVENOUS at 09:12

## 2021-12-15 RX ADMIN — KETOROLAC TROMETHAMINE 60 MG: 30 INJECTION, SOLUTION INTRAMUSCULAR; INTRAVENOUS at 09:12

## 2021-12-17 ENCOUNTER — APPOINTMENT (OUTPATIENT)
Dept: RADIOLOGY | Facility: CLINIC | Age: 41
End: 2021-12-17
Attending: FAMILY MEDICINE
Payer: MEDICARE

## 2021-12-17 ENCOUNTER — OFFICE VISIT (OUTPATIENT)
Dept: PRIMARY CARE CLINIC | Facility: CLINIC | Age: 41
End: 2021-12-17
Payer: MEDICARE

## 2021-12-17 VITALS
TEMPERATURE: 98 F | OXYGEN SATURATION: 93 % | HEIGHT: 62 IN | HEART RATE: 132 BPM | RESPIRATION RATE: 22 BRPM | SYSTOLIC BLOOD PRESSURE: 130 MMHG | DIASTOLIC BLOOD PRESSURE: 82 MMHG | BODY MASS INDEX: 37.17 KG/M2 | WEIGHT: 202 LBS

## 2021-12-17 DIAGNOSIS — M33.20 POLYMYOSITIS: ICD-10-CM

## 2021-12-17 DIAGNOSIS — A15.0 TB (PULMONARY TUBERCULOSIS): ICD-10-CM

## 2021-12-17 DIAGNOSIS — R05.9 COUGH: Primary | ICD-10-CM

## 2021-12-17 DIAGNOSIS — J84.10 PULMONARY FIBROSIS: ICD-10-CM

## 2021-12-17 DIAGNOSIS — R05.9 COUGH: ICD-10-CM

## 2021-12-17 PROCEDURE — 99213 PR OFFICE/OUTPT VISIT, EST, LEVL III, 20-29 MIN: ICD-10-PCS | Mod: 25,,, | Performed by: FAMILY MEDICINE

## 2021-12-17 PROCEDURE — 96372 PR INJECTION,THERAP/PROPH/DIAG2ST, IM OR SUBCUT: ICD-10-PCS | Mod: ,,, | Performed by: FAMILY MEDICINE

## 2021-12-17 PROCEDURE — 71046 X-RAY EXAM CHEST 2 VIEWS: CPT | Mod: 26,,, | Performed by: RADIOLOGY

## 2021-12-17 PROCEDURE — 99213 OFFICE O/P EST LOW 20 MIN: CPT | Mod: 25,,, | Performed by: FAMILY MEDICINE

## 2021-12-17 PROCEDURE — 96372 THER/PROPH/DIAG INJ SC/IM: CPT | Mod: ,,, | Performed by: FAMILY MEDICINE

## 2021-12-17 PROCEDURE — 71046 XR CHEST PA AND LATERAL: ICD-10-PCS | Mod: 26,,, | Performed by: RADIOLOGY

## 2021-12-17 PROCEDURE — 71046 X-RAY EXAM CHEST 2 VIEWS: CPT | Mod: TC,RHCUB | Performed by: FAMILY MEDICINE

## 2021-12-17 RX ORDER — CEFTRIAXONE 1 G/1
1 INJECTION, POWDER, FOR SOLUTION INTRAMUSCULAR; INTRAVENOUS
Status: COMPLETED | OUTPATIENT
Start: 2021-12-17 | End: 2021-12-17

## 2021-12-17 RX ORDER — BETAMETHASONE SODIUM PHOSPHATE AND BETAMETHASONE ACETATE 3; 3 MG/ML; MG/ML
6 INJECTION, SUSPENSION INTRA-ARTICULAR; INTRALESIONAL; INTRAMUSCULAR; SOFT TISSUE
Status: COMPLETED | OUTPATIENT
Start: 2021-12-17 | End: 2021-12-17

## 2021-12-17 RX ADMIN — CEFTRIAXONE 1 G: 1 INJECTION, POWDER, FOR SOLUTION INTRAMUSCULAR; INTRAVENOUS at 10:12

## 2021-12-17 RX ADMIN — BETAMETHASONE SODIUM PHOSPHATE AND BETAMETHASONE ACETATE 6 MG: 3; 3 INJECTION, SUSPENSION INTRA-ARTICULAR; INTRALESIONAL; INTRAMUSCULAR; SOFT TISSUE at 10:12

## 2021-12-22 ENCOUNTER — OFFICE VISIT (OUTPATIENT)
Dept: PAIN MEDICINE | Facility: CLINIC | Age: 41
End: 2021-12-22
Payer: MEDICARE

## 2021-12-22 VITALS
BODY MASS INDEX: 37.54 KG/M2 | SYSTOLIC BLOOD PRESSURE: 140 MMHG | HEART RATE: 110 BPM | DIASTOLIC BLOOD PRESSURE: 92 MMHG | OXYGEN SATURATION: 98 % | RESPIRATION RATE: 18 BRPM | HEIGHT: 62 IN | WEIGHT: 204 LBS

## 2021-12-22 DIAGNOSIS — M33.22 POLYMYOSITIS WITH MYOPATHY: Chronic | ICD-10-CM

## 2021-12-22 DIAGNOSIS — Z79.899 ENCOUNTER FOR LONG-TERM (CURRENT) USE OF OTHER MEDICATIONS: ICD-10-CM

## 2021-12-22 DIAGNOSIS — M47.817 LUMBOSACRAL SPONDYLOSIS WITHOUT MYELOPATHY: Chronic | ICD-10-CM

## 2021-12-22 DIAGNOSIS — R10.84 GENERALIZED ABDOMINAL PAIN: Chronic | ICD-10-CM

## 2021-12-22 DIAGNOSIS — K86.1 IDIOPATHIC CHRONIC PANCREATITIS: Primary | Chronic | ICD-10-CM

## 2021-12-22 LAB

## 2021-12-22 PROCEDURE — 99214 PR OFFICE/OUTPT VISIT, EST, LEVL IV, 30-39 MIN: ICD-10-PCS | Mod: S$PBB,,, | Performed by: PHYSICIAN ASSISTANT

## 2021-12-22 PROCEDURE — G0481 DRUG TEST DEF 8-14 CLASSES: HCPCS | Mod: ,,, | Performed by: CLINICAL MEDICAL LABORATORY

## 2021-12-22 PROCEDURE — 99214 OFFICE O/P EST MOD 30 MIN: CPT | Mod: PBBFAC | Performed by: PHYSICIAN ASSISTANT

## 2021-12-22 PROCEDURE — 80305 DRUG TEST PRSMV DIR OPT OBS: CPT | Mod: PBBFAC | Performed by: PHYSICIAN ASSISTANT

## 2021-12-22 PROCEDURE — 99214 OFFICE O/P EST MOD 30 MIN: CPT | Mod: S$PBB,,, | Performed by: PHYSICIAN ASSISTANT

## 2021-12-22 PROCEDURE — G0481 PR DRUG TEST DEF 8-14 CLASSES: ICD-10-PCS | Mod: ,,, | Performed by: CLINICAL MEDICAL LABORATORY

## 2021-12-22 RX ORDER — OXYCODONE AND ACETAMINOPHEN 7.5; 325 MG/1; MG/1
1 TABLET ORAL EVERY 8 HOURS
Qty: 90 TABLET | Refills: 0 | Status: SHIPPED | OUTPATIENT
Start: 2021-12-24 | End: 2022-01-18 | Stop reason: SDUPTHER

## 2021-12-22 RX ORDER — OXYCODONE AND ACETAMINOPHEN 7.5; 325 MG/1; MG/1
1 TABLET ORAL EVERY 8 HOURS
Qty: 90 TABLET | Refills: 0 | Status: CANCELLED | OUTPATIENT
Start: 2021-12-22 | End: 2022-01-21

## 2021-12-27 LAB
6-ACETYLMORPHINE, URINE (RUSH): NEGATIVE 10 NG/ML
7-AMINOCLONAZEPAM, URINE (RUSH): NEGATIVE 25 NG/ML
A-HYDROXYALPRAZOLAM, URINE (RUSH): NEGATIVE 25 NG/ML
ACETYL FENTANYL, URINE (RUSH): NEGATIVE 2.5 NG/ML
ACETYL NORFENTANYL OXALATE, URINE (RUSH): NEGATIVE 5 NG/ML
AMPHET UR QL SCN: NEGATIVE 100 NG/ML
BENZOYLECGONINE, URINE (RUSH): NEGATIVE 100 NG/ML
BUPRENORPHINE UR QL SCN: NEGATIVE 25 NG/ML
CODEINE, URINE (RUSH): NEGATIVE 25 NG/ML
CREAT UR-MCNC: 230 MG/DL (ref 28–219)
EDDP, URINE (RUSH): NEGATIVE 25 NG/ML
FENTANYL, URINE (RUSH): NEGATIVE 2.5 NG/ML
HYDROCODONE, URINE (RUSH): NEGATIVE 25 NG/ML
HYDROMORPHONE, URINE (RUSH): NEGATIVE 25 NG/ML
LORAZEPAM, URINE (RUSH): NEGATIVE 25 NG/ML
METHADONE UR QL SCN: NEGATIVE 25 NG/ML
METHAMPHET UR QL SCN: NEGATIVE 100 NG/ML
MORPHINE, URINE (RUSH): NEGATIVE 25 NG/ML
NORBUPRENORPHINE, URINE (RUSH): NEGATIVE 25 NG/ML
NORDIAZEPAM, URINE (RUSH): NEGATIVE 25 NG/ML
NORFENTANYL OXALATE, URINE (RUSH): NEGATIVE 5 NG/ML
NORHYDROCODONE, URINE (RUSH): NEGATIVE 50 NG/ML
NOROXYCODONE HCL, URINE (RUSH): 258.3 50 NG/ML
OXAZEPAM, URINE (RUSH): NEGATIVE 25 NG/ML
OXYCODONE UR QL SCN: 29.8 25 NG/ML
OXYMORPHONE, URINE (RUSH): NEGATIVE 25 NG/ML
PH UR STRIP: 6 PH UNITS
SP GR UR STRIP: 1.02
TAPENTADOL, URINE (RUSH): NEGATIVE 25 NG/ML
TEMAZEPAM, URINE (RUSH): NEGATIVE 25 NG/ML
THC-COOH, URINE (RUSH): NEGATIVE 25 NG/ML
TRAMADOL, URINE (RUSH): NEGATIVE 100 NG/ML

## 2022-01-01 ENCOUNTER — OFFICE VISIT (OUTPATIENT)
Dept: PRIMARY CARE CLINIC | Facility: CLINIC | Age: 42
End: 2022-01-01
Payer: MEDICARE

## 2022-01-01 ENCOUNTER — OFFICE VISIT (OUTPATIENT)
Dept: PAIN MEDICINE | Facility: CLINIC | Age: 42
End: 2022-01-01
Payer: MEDICARE

## 2022-01-01 ENCOUNTER — EXTERNAL CHRONIC CARE MANAGEMENT (OUTPATIENT)
Dept: PRIMARY CARE CLINIC | Facility: CLINIC | Age: 42
End: 2022-01-01
Payer: MEDICARE

## 2022-01-01 ENCOUNTER — APPOINTMENT (OUTPATIENT)
Dept: RADIOLOGY | Facility: CLINIC | Age: 42
End: 2022-01-01
Attending: FAMILY MEDICINE
Payer: MEDICARE

## 2022-01-01 ENCOUNTER — HOSPITAL ENCOUNTER (OUTPATIENT)
Dept: RADIOLOGY | Facility: HOSPITAL | Age: 42
Discharge: HOME | End: 2022-04-26
Attending: PHYSICIAN ASSISTANT
Payer: MEDICARE

## 2022-01-01 ENCOUNTER — TELEPHONE (OUTPATIENT)
Dept: PRIMARY CARE CLINIC | Facility: CLINIC | Age: 42
End: 2022-01-01
Payer: MEDICARE

## 2022-01-01 ENCOUNTER — HOSPITAL ENCOUNTER (EMERGENCY)
Facility: HOSPITAL | Age: 42
Discharge: HOME | End: 2022-06-06
Attending: FAMILY MEDICINE
Payer: MEDICARE

## 2022-01-01 ENCOUNTER — TELEPHONE (OUTPATIENT)
Dept: EMERGENCY MEDICINE | Facility: HOSPITAL | Age: 42
End: 2022-01-01
Payer: MEDICARE

## 2022-01-01 ENCOUNTER — HOSPITAL ENCOUNTER (OUTPATIENT)
Dept: RADIOLOGY | Facility: HOSPITAL | Age: 42
Discharge: HOME | End: 2022-04-20
Attending: PHYSICIAN ASSISTANT
Payer: MEDICARE

## 2022-01-01 VITALS
RESPIRATION RATE: 20 BRPM | HEIGHT: 62 IN | OXYGEN SATURATION: 97 % | TEMPERATURE: 99 F | WEIGHT: 208 LBS | SYSTOLIC BLOOD PRESSURE: 122 MMHG | BODY MASS INDEX: 38.28 KG/M2 | DIASTOLIC BLOOD PRESSURE: 80 MMHG | HEART RATE: 96 BPM

## 2022-01-01 VITALS
BODY MASS INDEX: 35.15 KG/M2 | DIASTOLIC BLOOD PRESSURE: 86 MMHG | TEMPERATURE: 100 F | HEART RATE: 105 BPM | HEIGHT: 62 IN | WEIGHT: 191 LBS | OXYGEN SATURATION: 96 % | SYSTOLIC BLOOD PRESSURE: 136 MMHG | RESPIRATION RATE: 20 BRPM

## 2022-01-01 VITALS
HEIGHT: 62 IN | DIASTOLIC BLOOD PRESSURE: 104 MMHG | HEART RATE: 114 BPM | SYSTOLIC BLOOD PRESSURE: 171 MMHG | BODY MASS INDEX: 35.51 KG/M2 | WEIGHT: 193 LBS

## 2022-01-01 VITALS
DIASTOLIC BLOOD PRESSURE: 60 MMHG | OXYGEN SATURATION: 98 % | WEIGHT: 194 LBS | RESPIRATION RATE: 20 BRPM | TEMPERATURE: 99 F | BODY MASS INDEX: 35.7 KG/M2 | HEART RATE: 94 BPM | SYSTOLIC BLOOD PRESSURE: 112 MMHG | HEIGHT: 62 IN

## 2022-01-01 VITALS
TEMPERATURE: 99 F | SYSTOLIC BLOOD PRESSURE: 140 MMHG | OXYGEN SATURATION: 97 % | HEART RATE: 90 BPM | DIASTOLIC BLOOD PRESSURE: 90 MMHG | HEART RATE: 62 BPM | WEIGHT: 193 LBS | BODY MASS INDEX: 34.78 KG/M2 | OXYGEN SATURATION: 97 % | WEIGHT: 189 LBS | SYSTOLIC BLOOD PRESSURE: 120 MMHG | DIASTOLIC BLOOD PRESSURE: 68 MMHG | HEIGHT: 62 IN | TEMPERATURE: 99 F | BODY MASS INDEX: 35.51 KG/M2 | HEIGHT: 62 IN

## 2022-01-01 VITALS
RESPIRATION RATE: 20 BRPM | BODY MASS INDEX: 31.02 KG/M2 | WEIGHT: 193 LBS | SYSTOLIC BLOOD PRESSURE: 148 MMHG | HEIGHT: 66 IN | DIASTOLIC BLOOD PRESSURE: 96 MMHG | HEART RATE: 101 BPM

## 2022-01-01 VITALS
WEIGHT: 191 LBS | TEMPERATURE: 99 F | HEART RATE: 89 BPM | BODY MASS INDEX: 35.15 KG/M2 | HEIGHT: 62 IN | SYSTOLIC BLOOD PRESSURE: 132 MMHG | DIASTOLIC BLOOD PRESSURE: 70 MMHG | OXYGEN SATURATION: 89 % | RESPIRATION RATE: 20 BRPM

## 2022-01-01 VITALS
TEMPERATURE: 99 F | DIASTOLIC BLOOD PRESSURE: 97 MMHG | SYSTOLIC BLOOD PRESSURE: 127 MMHG | HEIGHT: 62 IN | BODY MASS INDEX: 36.44 KG/M2 | HEART RATE: 96 BPM | OXYGEN SATURATION: 95 % | WEIGHT: 198 LBS | RESPIRATION RATE: 22 BRPM

## 2022-01-01 VITALS
HEIGHT: 62 IN | BODY MASS INDEX: 36.99 KG/M2 | WEIGHT: 201 LBS | DIASTOLIC BLOOD PRESSURE: 100 MMHG | HEART RATE: 101 BPM | SYSTOLIC BLOOD PRESSURE: 160 MMHG

## 2022-01-01 VITALS
OXYGEN SATURATION: 99 % | HEIGHT: 62 IN | BODY MASS INDEX: 34.96 KG/M2 | WEIGHT: 190 LBS | DIASTOLIC BLOOD PRESSURE: 75 MMHG | HEART RATE: 57 BPM | SYSTOLIC BLOOD PRESSURE: 144 MMHG | RESPIRATION RATE: 18 BRPM | TEMPERATURE: 99 F

## 2022-01-01 VITALS
BODY MASS INDEX: 35.37 KG/M2 | DIASTOLIC BLOOD PRESSURE: 82 MMHG | HEIGHT: 62 IN | HEART RATE: 85 BPM | WEIGHT: 192.19 LBS | SYSTOLIC BLOOD PRESSURE: 129 MMHG

## 2022-01-01 VITALS
HEART RATE: 109 BPM | BODY MASS INDEX: 36.62 KG/M2 | SYSTOLIC BLOOD PRESSURE: 104 MMHG | OXYGEN SATURATION: 94 % | HEIGHT: 62 IN | DIASTOLIC BLOOD PRESSURE: 78 MMHG | WEIGHT: 199 LBS | TEMPERATURE: 99 F

## 2022-01-01 VITALS
TEMPERATURE: 99 F | BODY MASS INDEX: 36.8 KG/M2 | SYSTOLIC BLOOD PRESSURE: 110 MMHG | RESPIRATION RATE: 20 BRPM | DIASTOLIC BLOOD PRESSURE: 68 MMHG | HEART RATE: 93 BPM | WEIGHT: 200 LBS | HEIGHT: 62 IN | OXYGEN SATURATION: 94 %

## 2022-01-01 VITALS
WEIGHT: 193 LBS | BODY MASS INDEX: 35.51 KG/M2 | HEART RATE: 104 BPM | HEIGHT: 62 IN | SYSTOLIC BLOOD PRESSURE: 134 MMHG | DIASTOLIC BLOOD PRESSURE: 88 MMHG

## 2022-01-01 DIAGNOSIS — K86.1 IDIOPATHIC CHRONIC PANCREATITIS: Chronic | ICD-10-CM

## 2022-01-01 DIAGNOSIS — J32.9 SINUSITIS, UNSPECIFIED CHRONICITY, UNSPECIFIED LOCATION: ICD-10-CM

## 2022-01-01 DIAGNOSIS — J40 BRONCHITIS: Primary | ICD-10-CM

## 2022-01-01 DIAGNOSIS — M19.90 ARTHRITIS: ICD-10-CM

## 2022-01-01 DIAGNOSIS — R50.9 FEVER, UNSPECIFIED FEVER CAUSE: ICD-10-CM

## 2022-01-01 DIAGNOSIS — R68.83 CHILLS: ICD-10-CM

## 2022-01-01 DIAGNOSIS — R05.9 COUGH: ICD-10-CM

## 2022-01-01 DIAGNOSIS — G25.81 RESTLESS LEG SYNDROME: ICD-10-CM

## 2022-01-01 DIAGNOSIS — Z71.89 COMPLEX CARE COORDINATION: ICD-10-CM

## 2022-01-01 DIAGNOSIS — M33.22 POLYMYOSITIS WITH MYOPATHY: Primary | Chronic | ICD-10-CM

## 2022-01-01 DIAGNOSIS — R06.02 SHORTNESS OF BREATH: ICD-10-CM

## 2022-01-01 DIAGNOSIS — R10.13 ABDOMINAL PAIN, EPIGASTRIC: ICD-10-CM

## 2022-01-01 DIAGNOSIS — M33.20 POLYMYOSITIS: ICD-10-CM

## 2022-01-01 DIAGNOSIS — G43.109 MIGRAINE WITH AURA AND WITHOUT STATUS MIGRAINOSUS, NOT INTRACTABLE: Primary | ICD-10-CM

## 2022-01-01 DIAGNOSIS — N30.01 ACUTE CYSTITIS WITH HEMATURIA: Primary | ICD-10-CM

## 2022-01-01 DIAGNOSIS — R05.9 COUGH, UNSPECIFIED TYPE: Primary | ICD-10-CM

## 2022-01-01 DIAGNOSIS — G44.40 DRUG-INDUCED HEADACHE, NOT ELSEWHERE CLASSIFIED, NOT INTRACTABLE: Primary | ICD-10-CM

## 2022-01-01 DIAGNOSIS — B37.9 YEAST INFECTION: ICD-10-CM

## 2022-01-01 DIAGNOSIS — J84.10 PULMONARY FIBROSIS: ICD-10-CM

## 2022-01-01 DIAGNOSIS — A15.0 TB (PULMONARY TUBERCULOSIS): ICD-10-CM

## 2022-01-01 DIAGNOSIS — Z79.899 ENCOUNTER FOR LONG-TERM (CURRENT) USE OF OTHER MEDICATIONS: ICD-10-CM

## 2022-01-01 DIAGNOSIS — J84.10 DIFFUSE IDIOPATHIC PULMONARY FIBROSIS: ICD-10-CM

## 2022-01-01 DIAGNOSIS — M54.9 DORSALGIA, UNSPECIFIED: ICD-10-CM

## 2022-01-01 DIAGNOSIS — R11.2 NAUSEA AND VOMITING, INTRACTABILITY OF VOMITING NOT SPECIFIED, UNSPECIFIED VOMITING TYPE: ICD-10-CM

## 2022-01-01 DIAGNOSIS — R19.7 DIARRHEA, UNSPECIFIED TYPE: ICD-10-CM

## 2022-01-01 DIAGNOSIS — R05.9 COUGHING: ICD-10-CM

## 2022-01-01 DIAGNOSIS — R52 BODY ACHES: ICD-10-CM

## 2022-01-01 DIAGNOSIS — R10.84 GENERALIZED ABDOMINAL PAIN: Primary | Chronic | ICD-10-CM

## 2022-01-01 DIAGNOSIS — R51.9 NONINTRACTABLE HEADACHE, UNSPECIFIED CHRONICITY PATTERN, UNSPECIFIED HEADACHE TYPE: ICD-10-CM

## 2022-01-01 DIAGNOSIS — R05.9 COUGH: Primary | ICD-10-CM

## 2022-01-01 DIAGNOSIS — N30.01 ACUTE CYSTITIS WITH HEMATURIA: ICD-10-CM

## 2022-01-01 DIAGNOSIS — G43.809 OTHER MIGRAINE WITHOUT STATUS MIGRAINOSUS, NOT INTRACTABLE: Primary | ICD-10-CM

## 2022-01-01 DIAGNOSIS — M33.22 POLYMYOSITIS WITH MYOPATHY: Chronic | ICD-10-CM

## 2022-01-01 DIAGNOSIS — R10.9 ABDOMINAL PAIN, UNSPECIFIED ABDOMINAL LOCATION: Primary | ICD-10-CM

## 2022-01-01 DIAGNOSIS — M54.17 LUMBOSACRAL RADICULOPATHY: Primary | Chronic | ICD-10-CM

## 2022-01-01 DIAGNOSIS — J06.9 UPPER RESPIRATORY TRACT INFECTION, UNSPECIFIED TYPE: ICD-10-CM

## 2022-01-01 DIAGNOSIS — R06.02 SOB (SHORTNESS OF BREATH): Primary | ICD-10-CM

## 2022-01-01 DIAGNOSIS — M54.17 LUMBOSACRAL RADICULOPATHY: ICD-10-CM

## 2022-01-01 DIAGNOSIS — J18.9 PNEUMONIA OF RIGHT LOWER LOBE DUE TO INFECTIOUS ORGANISM: ICD-10-CM

## 2022-01-01 DIAGNOSIS — R10.9 ABDOMINAL PAIN, UNSPECIFIED ABDOMINAL LOCATION: ICD-10-CM

## 2022-01-01 LAB
6-ACETYLMORPHINE, URINE (RUSH): NEGATIVE 10 NG/ML
7-AMINOCLONAZEPAM, URINE (RUSH): NEGATIVE 25 NG/ML
A-HYDROXYALPRAZOLAM, URINE (RUSH): NEGATIVE 25 NG/ML
ACETYL FENTANYL, URINE (RUSH): NEGATIVE 2.5 NG/ML
ACETYL NORFENTANYL OXALATE, URINE (RUSH): NEGATIVE 5 NG/ML
ALBUMIN SERPL BCP-MCNC: 3.4 G/DL (ref 3.5–5)
ALBUMIN/GLOB SERPL: 0.8 {RATIO}
ALP SERPL-CCNC: 91 U/L (ref 37–98)
ALT SERPL W P-5'-P-CCNC: 47 U/L (ref 13–56)
AMPHET UR QL SCN: NEGATIVE 100 NG/ML
ANION GAP SERPL CALCULATED.3IONS-SCNC: 13 MMOL/L (ref 7–16)
AST SERPL W P-5'-P-CCNC: 41 U/L (ref 15–37)
BASOPHILS # BLD AUTO: 0.01 K/UL (ref 0–0.2)
BASOPHILS NFR BLD AUTO: 0.1 % (ref 0–1)
BENZOYLECGONINE, URINE (RUSH): NEGATIVE 100 NG/ML
BILIRUB SERPL-MCNC: 0.2 MG/DL (ref 0–1.2)
BILIRUB SERPL-MCNC: NEGATIVE MG/DL
BLOOD URINE, POC: NORMAL
BUN SERPL-MCNC: 8 MG/DL (ref 7–18)
BUN/CREAT SERPL: 9 (ref 6–20)
BUPRENORPHINE UR QL SCN: NEGATIVE 25 NG/ML
CALCIUM SERPL-MCNC: 9.6 MG/DL (ref 8.5–10.1)
CHLORIDE SERPL-SCNC: 104 MMOL/L (ref 98–107)
CO2 SERPL-SCNC: 29 MMOL/L (ref 21–32)
CODEINE, URINE (RUSH): NEGATIVE 25 NG/ML
COLOR, POC UA: YELLOW
CREAT SERPL-MCNC: 0.93 MG/DL (ref 0.55–1.02)
CREAT UR-MCNC: 208 MG/DL (ref 28–219)
CTP QC/QA: YES
D DIMER PPP FEU-MCNC: 0.45 ΜG/ML (ref 0–0.47)
DIFFERENTIAL METHOD BLD: ABNORMAL
EDDP, URINE (RUSH): NEGATIVE 25 NG/ML
EOSINOPHIL # BLD AUTO: 0.18 K/UL (ref 0–0.5)
EOSINOPHIL NFR BLD AUTO: 2.5 % (ref 1–4)
ERYTHROCYTE [DISTWIDTH] IN BLOOD BY AUTOMATED COUNT: 14.4 % (ref 11.5–14.5)
FENTANYL, URINE (RUSH): NEGATIVE 2.5 NG/ML
FLUAV AG NPH QL: NEGATIVE
FLUAV AG NPH QL: NEGATIVE
FLUAV AG UPPER RESP QL IA.RAPID: NEGATIVE
FLUBV AG NPH QL: NEGATIVE
FLUBV AG NPH QL: NEGATIVE
FLUBV AG UPPER RESP QL IA.RAPID: NEGATIVE
GLOBULIN SER-MCNC: 4.4 G/DL (ref 2–4)
GLUCOSE SERPL-MCNC: 109 MG/DL (ref 74–106)
GLUCOSE UR QL STRIP: NEGATIVE
HCT VFR BLD AUTO: 42.9 % (ref 38–47)
HGB BLD-MCNC: 13.8 G/DL (ref 12–16)
HYDROCODONE, URINE (RUSH): NEGATIVE 25 NG/ML
HYDROMORPHONE, URINE (RUSH): NEGATIVE 25 NG/ML
IMM GRANULOCYTES # BLD AUTO: 0.02 K/UL (ref 0–0.04)
IMM GRANULOCYTES NFR BLD: 0.3 % (ref 0–0.4)
KETONES UR QL STRIP: NEGATIVE
LEUKOCYTE ESTERASE URINE, POC: NORMAL
LIPASE SERPL-CCNC: 137 U/L (ref 73–393)
LORAZEPAM, URINE (RUSH): NEGATIVE 25 NG/ML
LYMPHOCYTES # BLD AUTO: 1.84 K/UL (ref 1–4.8)
LYMPHOCYTES NFR BLD AUTO: 25.8 % (ref 27–41)
MCH RBC QN AUTO: 27.3 PG (ref 27–31)
MCHC RBC AUTO-ENTMCNC: 32.2 G/DL (ref 32–36)
MCV RBC AUTO: 85 FL (ref 80–96)
METHADONE UR QL SCN: NEGATIVE 25 NG/ML
METHAMPHET UR QL SCN: NEGATIVE 100 NG/ML
MONOCYTES # BLD AUTO: 0.84 K/UL (ref 0–0.8)
MONOCYTES NFR BLD AUTO: 11.8 % (ref 2–6)
MORPHINE, URINE (RUSH): NEGATIVE 25 NG/ML
MPC BLD CALC-MCNC: 9.1 FL (ref 9.4–12.4)
NEUTROPHILS # BLD AUTO: 4.23 K/UL (ref 1.8–7.7)
NEUTROPHILS NFR BLD AUTO: 59.5 % (ref 53–65)
NITRITE, POC UA: NEGATIVE
NORBUPRENORPHINE, URINE (RUSH): NEGATIVE 25 NG/ML
NORDIAZEPAM, URINE (RUSH): NEGATIVE 25 NG/ML
NORFENTANYL OXALATE, URINE (RUSH): NEGATIVE 5 NG/ML
NORHYDROCODONE, URINE (RUSH): NEGATIVE 50 NG/ML
NOROXYCODONE HCL, URINE (RUSH): >500 50 NG/ML
NT-PROBNP SERPL-MCNC: 123 PG/ML (ref 1–125)
OXAZEPAM, URINE (RUSH): NEGATIVE 25 NG/ML
OXYCODONE UR QL SCN: >250 25 NG/ML
OXYMORPHONE, URINE (RUSH): 27.5 25 NG/ML
PH UR STRIP: 6 PH UNITS
PH, POC UA: 6
PLATELET # BLD AUTO: 377 K/UL (ref 150–400)
POC (AMP) AMPHETAMINE: NEGATIVE
POC (BAR) BARBITURATES: NEGATIVE
POC (BUP) BUPRENORPHINE: NEGATIVE
POC (BZO) BENZODIAZEPINES: NEGATIVE
POC (COC) COCAINE: NEGATIVE
POC (MDMA) METHYLENEDIOXYMETHAMPHETAMINE 3,4: NEGATIVE
POC (MET) METHAMPHETAMINE: NEGATIVE
POC (MOP) OPIATES: NEGATIVE
POC (MTD) METHADONE: NEGATIVE
POC (OXY) OXYCODONE: ABNORMAL
POC (PCP) PHENCYCLIDINE: NEGATIVE
POC (TCA) TRICYCLIC ANTIDEPRESSANTS: NEGATIVE
POC TEMPERATURE (URINE): 90
POC TEMPERATURE (URINE): 90
POC TEMPERATURE (URINE): 92
POC THC: NEGATIVE
POTASSIUM SERPL-SCNC: 4.6 MMOL/L (ref 3.5–5.1)
PROT SERPL-MCNC: 7.8 G/DL (ref 6.4–8.2)
PROTEIN, POC: NEGATIVE
RBC # BLD AUTO: 5.05 M/UL (ref 4.2–5.4)
SARS-COV+SARS-COV-2 AG RESP QL IA.RAPID: NEGATIVE
SARS-COV-2 AG RESP QL IA.RAPID: NEGATIVE
SARS-COV-2 AG RESP QL IA.RAPID: NEGATIVE
SODIUM SERPL-SCNC: 141 MMOL/L (ref 136–145)
SP GR UR STRIP: 1.02
SPECIFIC GRAVITY, POC UA: 1.01
TAPENTADOL, URINE (RUSH): NEGATIVE 25 NG/ML
TEMAZEPAM, URINE (RUSH): NEGATIVE 25 NG/ML
THC-COOH, URINE (RUSH): NEGATIVE 25 NG/ML
TRAMADOL, URINE (RUSH): NEGATIVE 100 NG/ML
UA COMPLETE W REFLEX CULTURE PNL UR: ABNORMAL
UROBILINOGEN, POC UA: 0.2
WBC # BLD AUTO: 7.12 K/UL (ref 4.5–11)

## 2022-01-01 PROCEDURE — 99214 OFFICE O/P EST MOD 30 MIN: CPT | Mod: PBBFAC | Performed by: PHYSICIAN ASSISTANT

## 2022-01-01 PROCEDURE — G0511 CCM/BHI BY RHC/FQHC 20MIN MO: HCPCS | Mod: ,,, | Performed by: FAMILY MEDICINE

## 2022-01-01 PROCEDURE — 74018 RADEX ABDOMEN 1 VIEW: CPT | Mod: TC,RHCUB | Performed by: FAMILY MEDICINE

## 2022-01-01 PROCEDURE — 93010 ELECTROCARDIOGRAM REPORT: CPT | Mod: ,,, | Performed by: INTERNAL MEDICINE

## 2022-01-01 PROCEDURE — 96372 PR INJECTION,THERAP/PROPH/DIAG2ST, IM OR SUBCUT: ICD-10-PCS | Mod: ,,, | Performed by: FAMILY MEDICINE

## 2022-01-01 PROCEDURE — 96372 PR INJECTION,THERAP/PROPH/DIAG2ST, IM OR SUBCUT: ICD-10-PCS | Mod: ,,, | Performed by: NURSE PRACTITIONER

## 2022-01-01 PROCEDURE — 99214 OFFICE O/P EST MOD 30 MIN: CPT | Mod: S$PBB,,, | Performed by: PHYSICIAN ASSISTANT

## 2022-01-01 PROCEDURE — 96372 THER/PROPH/DIAG INJ SC/IM: CPT | Mod: ,,, | Performed by: FAMILY MEDICINE

## 2022-01-01 PROCEDURE — G0511 PR CHRONIC CARE MGMT, RHC OR FQHC ONLY, 20 MINS OR MORE: ICD-10-PCS | Mod: ,,, | Performed by: FAMILY MEDICINE

## 2022-01-01 PROCEDURE — 71046 XR CHEST PA AND LATERAL: ICD-10-PCS | Mod: 26,,, | Performed by: RADIOLOGY

## 2022-01-01 PROCEDURE — 87077 CULTURE, URINE: ICD-10-PCS | Mod: ,,, | Performed by: CLINICAL MEDICAL LABORATORY

## 2022-01-01 PROCEDURE — 80305 DRUG TEST PRSMV DIR OPT OBS: CPT | Mod: PBBFAC | Performed by: PHYSICIAN ASSISTANT

## 2022-01-01 PROCEDURE — 99213 OFFICE O/P EST LOW 20 MIN: CPT | Mod: 25,,, | Performed by: FAMILY MEDICINE

## 2022-01-01 PROCEDURE — 74018 XR KUB: ICD-10-PCS | Mod: 26,,, | Performed by: RADIOLOGY

## 2022-01-01 PROCEDURE — 87428 SARSCOV & INF VIR A&B AG IA: CPT | Mod: RHCUB | Performed by: FAMILY MEDICINE

## 2022-01-01 PROCEDURE — 99213 OFFICE O/P EST LOW 20 MIN: CPT | Mod: ,,, | Performed by: FAMILY MEDICINE

## 2022-01-01 PROCEDURE — 99213 PR OFFICE/OUTPT VISIT, EST, LEVL III, 20-29 MIN: ICD-10-PCS | Mod: ,,, | Performed by: FAMILY MEDICINE

## 2022-01-01 PROCEDURE — 99213 PR OFFICE/OUTPT VISIT, EST, LEVL III, 20-29 MIN: ICD-10-PCS | Mod: ,,, | Performed by: NURSE PRACTITIONER

## 2022-01-01 PROCEDURE — 63600175 PHARM REV CODE 636 W HCPCS: Performed by: FAMILY MEDICINE

## 2022-01-01 PROCEDURE — 93010 EKG 12-LEAD: ICD-10-PCS | Mod: ,,, | Performed by: INTERNAL MEDICINE

## 2022-01-01 PROCEDURE — 96375 TX/PRO/DX INJ NEW DRUG ADDON: CPT

## 2022-01-01 PROCEDURE — 87086 CULTURE, URINE: ICD-10-PCS | Mod: ,,, | Performed by: CLINICAL MEDICAL LABORATORY

## 2022-01-01 PROCEDURE — 71046 X-RAY EXAM CHEST 2 VIEWS: CPT | Mod: TC,RHCUB | Performed by: FAMILY MEDICINE

## 2022-01-01 PROCEDURE — 99214 OFFICE O/P EST MOD 30 MIN: CPT | Mod: ,,, | Performed by: FAMILY MEDICINE

## 2022-01-01 PROCEDURE — 83690 ASSAY OF LIPASE: CPT | Performed by: FAMILY MEDICINE

## 2022-01-01 PROCEDURE — G0481 DRUG TEST DEF 8-14 CLASSES: HCPCS | Mod: ,,, | Performed by: CLINICAL MEDICAL LABORATORY

## 2022-01-01 PROCEDURE — 87077 CULTURE AEROBIC IDENTIFY: CPT | Mod: ,,, | Performed by: CLINICAL MEDICAL LABORATORY

## 2022-01-01 PROCEDURE — 99213 OFFICE O/P EST LOW 20 MIN: CPT | Mod: ,,, | Performed by: NURSE PRACTITIONER

## 2022-01-01 PROCEDURE — 36415 COLL VENOUS BLD VENIPUNCTURE: CPT | Performed by: FAMILY MEDICINE

## 2022-01-01 PROCEDURE — 96374 THER/PROPH/DIAG INJ IV PUSH: CPT

## 2022-01-01 PROCEDURE — 87186 SC STD MICRODIL/AGAR DIL: CPT | Mod: ,,, | Performed by: CLINICAL MEDICAL LABORATORY

## 2022-01-01 PROCEDURE — 99214 PR OFFICE/OUTPT VISIT, EST, LEVL IV, 30-39 MIN: ICD-10-PCS | Mod: S$PBB,,, | Performed by: PHYSICIAN ASSISTANT

## 2022-01-01 PROCEDURE — 93005 ELECTROCARDIOGRAM TRACING: CPT

## 2022-01-01 PROCEDURE — 87086 URINE CULTURE/COLONY COUNT: CPT | Mod: ,,, | Performed by: CLINICAL MEDICAL LABORATORY

## 2022-01-01 PROCEDURE — 73521 X-RAY EXAM HIPS BI 2 VIEWS: CPT | Mod: TC

## 2022-01-01 PROCEDURE — 96376 TX/PRO/DX INJ SAME DRUG ADON: CPT

## 2022-01-01 PROCEDURE — 73521 X-RAY EXAM HIPS BI 2 VIEWS: CPT | Mod: 26,,, | Performed by: RADIOLOGY

## 2022-01-01 PROCEDURE — 87428 SARSCOV & INF VIR A&B AG IA: CPT | Performed by: FAMILY MEDICINE

## 2022-01-01 PROCEDURE — 99215 OFFICE O/P EST HI 40 MIN: CPT | Mod: PBBFAC | Performed by: PHYSICIAN ASSISTANT

## 2022-01-01 PROCEDURE — 71046 X-RAY EXAM CHEST 2 VIEWS: CPT | Mod: 26,,, | Performed by: RADIOLOGY

## 2022-01-01 PROCEDURE — 74018 RADEX ABDOMEN 1 VIEW: CPT | Mod: 26,,, | Performed by: RADIOLOGY

## 2022-01-01 PROCEDURE — 96372 THER/PROPH/DIAG INJ SC/IM: CPT | Mod: ,,, | Performed by: NURSE PRACTITIONER

## 2022-01-01 PROCEDURE — 99214 PR OFFICE/OUTPT VISIT, EST, LEVL IV, 30-39 MIN: ICD-10-PCS | Mod: ,,, | Performed by: FAMILY MEDICINE

## 2022-01-01 PROCEDURE — 85378 FIBRIN DEGRADE SEMIQUANT: CPT | Performed by: FAMILY MEDICINE

## 2022-01-01 PROCEDURE — 80053 COMPREHEN METABOLIC PANEL: CPT | Performed by: FAMILY MEDICINE

## 2022-01-01 PROCEDURE — 99213 PR OFFICE/OUTPT VISIT, EST, LEVL III, 20-29 MIN: ICD-10-PCS | Mod: 25,,, | Performed by: FAMILY MEDICINE

## 2022-01-01 PROCEDURE — 72148 MRI LUMBAR SPINE W/O DYE: CPT | Mod: TC

## 2022-01-01 PROCEDURE — 83880 ASSAY OF NATRIURETIC PEPTIDE: CPT | Performed by: FAMILY MEDICINE

## 2022-01-01 PROCEDURE — 99285 EMERGENCY DEPT VISIT HI MDM: CPT | Mod: 25

## 2022-01-01 PROCEDURE — 81003 URINALYSIS AUTO W/O SCOPE: CPT | Mod: RHCUB | Performed by: FAMILY MEDICINE

## 2022-01-01 PROCEDURE — 99283 EMERGENCY DEPT VISIT LOW MDM: CPT | Performed by: FAMILY MEDICINE

## 2022-01-01 PROCEDURE — 85025 COMPLETE CBC W/AUTO DIFF WBC: CPT | Performed by: FAMILY MEDICINE

## 2022-01-01 PROCEDURE — 87428 SARSCOV & INF VIR A&B AG IA: CPT | Mod: RHCUB | Performed by: NURSE PRACTITIONER

## 2022-01-01 PROCEDURE — G0481 PR DRUG TEST DEF 8-14 CLASSES: ICD-10-PCS | Mod: ,,, | Performed by: CLINICAL MEDICAL LABORATORY

## 2022-01-01 PROCEDURE — 87186 CULTURE, URINE: ICD-10-PCS | Mod: ,,, | Performed by: CLINICAL MEDICAL LABORATORY

## 2022-01-01 PROCEDURE — 73521 XR HIPS BILATERAL 2 VIEW INCL AP PELVIS: ICD-10-PCS | Mod: 26,,, | Performed by: RADIOLOGY

## 2022-01-01 RX ORDER — PROMETHAZINE HYDROCHLORIDE 25 MG/ML
25 INJECTION, SOLUTION INTRAMUSCULAR; INTRAVENOUS
Status: COMPLETED | OUTPATIENT
Start: 2022-01-01 | End: 2022-01-01

## 2022-01-01 RX ORDER — DIAZEPAM 10 MG/1
TABLET ORAL
Qty: 2 TABLET | Refills: 0 | Status: SHIPPED | OUTPATIENT
Start: 2022-01-01 | End: 2022-01-01

## 2022-01-01 RX ORDER — KETOROLAC TROMETHAMINE 15 MG/ML
15 INJECTION, SOLUTION INTRAMUSCULAR; INTRAVENOUS
Status: COMPLETED | OUTPATIENT
Start: 2022-01-01 | End: 2022-01-01

## 2022-01-01 RX ORDER — CIPROFLOXACIN 250 MG/1
250 TABLET, FILM COATED ORAL
COMMUNITY
End: 2022-01-01 | Stop reason: SDUPTHER

## 2022-01-01 RX ORDER — OXYCODONE AND ACETAMINOPHEN 7.5; 325 MG/1; MG/1
1 TABLET ORAL EVERY 8 HOURS
Qty: 90 TABLET | Refills: 0 | Status: SHIPPED | OUTPATIENT
Start: 2023-01-01

## 2022-01-01 RX ORDER — OXYCODONE AND ACETAMINOPHEN 7.5; 325 MG/1; MG/1
1 TABLET ORAL EVERY 8 HOURS
Qty: 90 TABLET | Refills: 0 | Status: SHIPPED | OUTPATIENT
Start: 2022-01-01 | End: 2022-01-01 | Stop reason: SDUPTHER

## 2022-01-01 RX ORDER — FLUCONAZOLE 150 MG/1
150 TABLET ORAL DAILY
Qty: 10 TABLET | Refills: 0 | Status: SHIPPED | OUTPATIENT
Start: 2022-01-01 | End: 2022-01-01

## 2022-01-01 RX ORDER — OXYCODONE AND ACETAMINOPHEN 7.5; 325 MG/1; MG/1
1 TABLET ORAL EVERY 8 HOURS
Qty: 90 TABLET | Refills: 0 | Status: SHIPPED | OUTPATIENT
Start: 2022-01-01 | End: 2022-01-01

## 2022-01-01 RX ORDER — CEFTRIAXONE 1 G/1
1 INJECTION, POWDER, FOR SOLUTION INTRAMUSCULAR; INTRAVENOUS
Status: COMPLETED | OUTPATIENT
Start: 2022-01-01 | End: 2022-01-01

## 2022-01-01 RX ORDER — LORAZEPAM 2 MG/ML
2 INJECTION INTRAMUSCULAR
Status: COMPLETED | OUTPATIENT
Start: 2022-01-01 | End: 2022-01-01

## 2022-01-01 RX ORDER — KETOROLAC TROMETHAMINE 30 MG/ML
60 INJECTION, SOLUTION INTRAMUSCULAR; INTRAVENOUS
Status: COMPLETED | OUTPATIENT
Start: 2022-01-01 | End: 2022-01-01

## 2022-01-01 RX ORDER — BETAMETHASONE SODIUM PHOSPHATE AND BETAMETHASONE ACETATE 3; 3 MG/ML; MG/ML
6 INJECTION, SUSPENSION INTRA-ARTICULAR; INTRALESIONAL; INTRAMUSCULAR; SOFT TISSUE
Status: COMPLETED | OUTPATIENT
Start: 2022-01-01 | End: 2022-01-01

## 2022-01-01 RX ORDER — ONDANSETRON 2 MG/ML
4 INJECTION INTRAMUSCULAR; INTRAVENOUS
Status: COMPLETED | OUTPATIENT
Start: 2022-01-01 | End: 2022-01-01

## 2022-01-01 RX ORDER — SULFAMETHOXAZOLE AND TRIMETHOPRIM 800; 160 MG/1; MG/1
1 TABLET ORAL 2 TIMES DAILY
Qty: 20 TABLET | Refills: 0 | Status: SHIPPED | OUTPATIENT
Start: 2022-01-01 | End: 2022-01-01

## 2022-01-01 RX ORDER — FLUCONAZOLE 150 MG/1
150 TABLET ORAL DAILY
Qty: 10 TABLET | Refills: 1 | Status: SHIPPED | OUTPATIENT
Start: 2022-01-01 | End: 2022-01-01

## 2022-01-01 RX ORDER — OXYCODONE AND ACETAMINOPHEN 7.5; 325 MG/1; MG/1
1 TABLET ORAL EVERY 8 HOURS PRN
Qty: 90 TABLET | Refills: 0 | Status: SHIPPED | OUTPATIENT
Start: 2022-01-01 | End: 2022-01-01 | Stop reason: SDUPTHER

## 2022-01-01 RX ORDER — FLUCONAZOLE 150 MG/1
150 TABLET ORAL DAILY
COMMUNITY
End: 2022-01-01 | Stop reason: SDUPTHER

## 2022-01-01 RX ORDER — ROPINIROLE 0.25 MG/1
0.25 TABLET, FILM COATED ORAL EVERY 8 HOURS
Qty: 90 TABLET | Refills: 1 | Status: SHIPPED | OUTPATIENT
Start: 2022-01-01 | End: 2022-01-01 | Stop reason: SDUPTHER

## 2022-01-01 RX ORDER — BETAMETHASONE SODIUM PHOSPHATE AND BETAMETHASONE ACETATE 3; 3 MG/ML; MG/ML
6 INJECTION, SUSPENSION INTRA-ARTICULAR; INTRALESIONAL; INTRAMUSCULAR; SOFT TISSUE ONCE
Status: COMPLETED | OUTPATIENT
Start: 2022-01-01 | End: 2022-01-01

## 2022-01-01 RX ORDER — GUAIFENESIN 1200 MG/1
1 TABLET, EXTENDED RELEASE ORAL EVERY 12 HOURS PRN
Qty: 60 TABLET | Refills: 1 | Status: SHIPPED | OUTPATIENT
Start: 2022-01-01 | End: 2023-01-01 | Stop reason: ALTCHOICE

## 2022-01-01 RX ORDER — FLUCONAZOLE 150 MG/1
150 TABLET ORAL DAILY
Qty: 10 TABLET | Refills: 1 | Status: SHIPPED | OUTPATIENT
Start: 2022-01-01 | End: 2022-01-01 | Stop reason: SDUPTHER

## 2022-01-01 RX ORDER — DOXYCYCLINE 100 MG/1
100 CAPSULE ORAL 2 TIMES DAILY
Qty: 20 CAPSULE | Refills: 0 | Status: SHIPPED | OUTPATIENT
Start: 2022-01-01 | End: 2022-01-01

## 2022-01-01 RX ORDER — DEXCHLORPHENIRAMINE MALEATE, DEXTROMETHORPHAN HBR, PHENYLEPHRINE HCL 1; 10; 5 MG/5ML; MG/5ML; MG/5ML
10 SYRUP ORAL
Qty: 240 ML | Refills: 1 | Status: SHIPPED | OUTPATIENT
Start: 2022-01-01 | End: 2022-01-01 | Stop reason: ALTCHOICE

## 2022-01-01 RX ORDER — NEBIVOLOL 20 MG/1
1 TABLET ORAL DAILY
COMMUNITY
Start: 2022-01-01

## 2022-01-01 RX ORDER — ONDANSETRON 4 MG/1
4 TABLET, ORALLY DISINTEGRATING ORAL EVERY 6 HOURS PRN
Qty: 12 TABLET | Refills: 0 | Status: SHIPPED | OUTPATIENT
Start: 2022-01-01

## 2022-01-01 RX ORDER — OXYCODONE AND ACETAMINOPHEN 7.5; 325 MG/1; MG/1
1 TABLET ORAL EVERY 8 HOURS
Qty: 90 TABLET | Refills: 0 | Status: SHIPPED | OUTPATIENT
Start: 2022-01-01 | End: 2023-01-01 | Stop reason: SDUPTHER

## 2022-01-01 RX ORDER — NALOXONE HYDROCHLORIDE 4 MG/.1ML
1 SPRAY NASAL ONCE
Qty: 1 EACH | Refills: 0 | Status: SHIPPED | OUTPATIENT
Start: 2022-01-01 | End: 2022-01-01

## 2022-01-01 RX ORDER — NINTEDANIB 150 MG/1
CAPSULE ORAL
COMMUNITY
Start: 2022-01-01

## 2022-01-01 RX ORDER — ALBUTEROL SULFATE 1.25 MG/3ML
1.25 SOLUTION RESPIRATORY (INHALATION) EVERY 6 HOURS PRN
Qty: 120 EACH | Refills: 1 | Status: SHIPPED | OUTPATIENT
Start: 2022-01-01 | End: 2023-06-28

## 2022-01-01 RX ORDER — ROPINIROLE 0.25 MG/1
0.25 TABLET, FILM COATED ORAL EVERY 8 HOURS
Qty: 90 TABLET | Refills: 1 | Status: SHIPPED | OUTPATIENT
Start: 2022-01-01

## 2022-01-01 RX ORDER — ROPINIROLE 0.25 MG/1
0.25 TABLET, FILM COATED ORAL EVERY 8 HOURS
Qty: 90 TABLET | Refills: 1 | Status: SHIPPED | OUTPATIENT
Start: 2022-01-01 | End: 2022-01-01

## 2022-01-01 RX ORDER — CIPROFLOXACIN 250 MG/1
250 TABLET, FILM COATED ORAL EVERY 12 HOURS
Qty: 20 TABLET | Refills: 0 | Status: SHIPPED | OUTPATIENT
Start: 2022-01-01 | End: 2022-01-01

## 2022-01-01 RX ORDER — AMLODIPINE BESYLATE 5 MG/1
5 TABLET ORAL DAILY
COMMUNITY
Start: 2022-01-01

## 2022-01-01 RX ORDER — SULFAMETHOXAZOLE AND TRIMETHOPRIM 800; 160 MG/1; MG/1
1 TABLET ORAL
COMMUNITY
End: 2022-01-01 | Stop reason: SDUPTHER

## 2022-01-01 RX ORDER — OXYCODONE AND ACETAMINOPHEN 7.5; 325 MG/1; MG/1
1 TABLET ORAL EVERY 12 HOURS PRN
Qty: 90 TABLET | Refills: 0 | Status: SHIPPED | OUTPATIENT
Start: 2022-01-01 | End: 2022-01-01

## 2022-01-01 RX ORDER — AMOXICILLIN AND CLAVULANATE POTASSIUM 500; 125 MG/1; MG/1
1 TABLET, FILM COATED ORAL 2 TIMES DAILY
Qty: 20 TABLET | Refills: 0 | Status: SHIPPED | OUTPATIENT
Start: 2022-01-01 | End: 2023-01-01 | Stop reason: ALTCHOICE

## 2022-01-01 RX ORDER — ONDANSETRON 4 MG/1
4 TABLET, ORALLY DISINTEGRATING ORAL EVERY 6 HOURS PRN
Qty: 12 TABLET | Refills: 0 | Status: SHIPPED | OUTPATIENT
Start: 2022-01-01 | End: 2022-01-01 | Stop reason: SDUPTHER

## 2022-01-01 RX ADMIN — CEFTRIAXONE 1 G: 1 INJECTION, POWDER, FOR SOLUTION INTRAMUSCULAR; INTRAVENOUS at 09:05

## 2022-01-01 RX ADMIN — PROMETHAZINE HYDROCHLORIDE 25 MG: 25 INJECTION, SOLUTION INTRAMUSCULAR; INTRAVENOUS at 09:08

## 2022-01-01 RX ADMIN — KETOROLAC TROMETHAMINE 60 MG: 30 INJECTION, SOLUTION INTRAMUSCULAR; INTRAVENOUS at 09:08

## 2022-01-01 RX ADMIN — KETOROLAC TROMETHAMINE 60 MG: 30 INJECTION, SOLUTION INTRAMUSCULAR; INTRAVENOUS at 10:08

## 2022-01-01 RX ADMIN — KETOROLAC TROMETHAMINE 60 MG: 30 INJECTION, SOLUTION INTRAMUSCULAR; INTRAVENOUS at 12:08

## 2022-01-01 RX ADMIN — CEFTRIAXONE 1 G: 1 INJECTION, POWDER, FOR SOLUTION INTRAMUSCULAR; INTRAVENOUS at 09:04

## 2022-01-01 RX ADMIN — KETOROLAC TROMETHAMINE 15 MG: 15 INJECTION, SOLUTION INTRAMUSCULAR; INTRAVENOUS at 12:06

## 2022-01-01 RX ADMIN — KETOROLAC TROMETHAMINE 60 MG: 30 INJECTION, SOLUTION INTRAMUSCULAR; INTRAVENOUS at 09:04

## 2022-01-01 RX ADMIN — PROMETHAZINE HYDROCHLORIDE 25 MG: 25 INJECTION, SOLUTION INTRAMUSCULAR; INTRAVENOUS at 10:08

## 2022-01-01 RX ADMIN — LORAZEPAM 2 MG: 2 INJECTION INTRAMUSCULAR at 10:08

## 2022-01-01 RX ADMIN — CEFTRIAXONE 1 G: 1 INJECTION, POWDER, FOR SOLUTION INTRAMUSCULAR; INTRAVENOUS at 03:12

## 2022-01-01 RX ADMIN — CEFTRIAXONE 1 G: 1 INJECTION, POWDER, FOR SOLUTION INTRAMUSCULAR; INTRAVENOUS at 09:09

## 2022-01-01 RX ADMIN — BETAMETHASONE SODIUM PHOSPHATE AND BETAMETHASONE ACETATE 6 MG: 3; 3 INJECTION, SUSPENSION INTRA-ARTICULAR; INTRALESIONAL; INTRAMUSCULAR; SOFT TISSUE at 10:06

## 2022-01-01 RX ADMIN — KETOROLAC TROMETHAMINE 60 MG: 30 INJECTION, SOLUTION INTRAMUSCULAR; INTRAVENOUS at 09:09

## 2022-01-01 RX ADMIN — KETOROLAC TROMETHAMINE 60 MG: 30 INJECTION, SOLUTION INTRAMUSCULAR; INTRAVENOUS at 09:05

## 2022-01-01 RX ADMIN — KETOROLAC TROMETHAMINE 15 MG: 15 INJECTION, SOLUTION INTRAMUSCULAR; INTRAVENOUS at 01:06

## 2022-01-01 RX ADMIN — ONDANSETRON 4 MG: 2 INJECTION INTRAMUSCULAR; INTRAVENOUS at 01:06

## 2022-01-01 RX ADMIN — PROMETHAZINE HYDROCHLORIDE 25 MG: 25 INJECTION, SOLUTION INTRAMUSCULAR; INTRAVENOUS at 09:09

## 2022-01-01 RX ADMIN — PROMETHAZINE HYDROCHLORIDE 25 MG: 25 INJECTION, SOLUTION INTRAMUSCULAR; INTRAVENOUS at 12:08

## 2022-01-01 RX ADMIN — BETAMETHASONE SODIUM PHOSPHATE AND BETAMETHASONE ACETATE 6 MG: 3; 3 INJECTION, SUSPENSION INTRA-ARTICULAR; INTRALESIONAL; INTRAMUSCULAR; SOFT TISSUE at 03:12

## 2022-01-01 RX ADMIN — LORAZEPAM 2 MG: 2 INJECTION INTRAMUSCULAR at 09:08

## 2022-01-01 RX ADMIN — BETAMETHASONE SODIUM PHOSPHATE AND BETAMETHASONE ACETATE 6 MG: 3; 3 INJECTION, SUSPENSION INTRA-ARTICULAR; INTRALESIONAL; INTRAMUSCULAR; SOFT TISSUE at 09:05

## 2022-01-18 RX ORDER — OXYCODONE AND ACETAMINOPHEN 7.5; 325 MG/1; MG/1
1 TABLET ORAL EVERY 8 HOURS
Qty: 90 TABLET | Refills: 0 | Status: CANCELLED | OUTPATIENT
Start: 2022-01-18 | End: 2022-02-17

## 2022-01-18 NOTE — PROGRESS NOTES
Disclaimer:  This note has been generated using voice recognition software.  There may be type of graft focal areas that have been missed during a proof reading      Subjective:      Patient ID: Alanna Briggs is a 41 y.o. female.    Chief Complaint: Low-back Pain, Abdominal Pain, and Joint Pain      Pain  This is a chronic problem. The current episode started more than 1 year ago. The problem occurs daily. The problem has been waxing and waning. Associated symptoms include neck pain. Pertinent negatives include no change in bowel habit, chest pain, coughing, fever, rash, sore throat, vertigo or vomiting.     Review of Systems   Constitutional: Negative for activity change, appetite change, fever and unexpected weight change.   HENT: Negative for drooling, ear discharge, ear pain, facial swelling, nosebleeds, sore throat, trouble swallowing, voice change and goiter.    Eyes: Negative for photophobia, pain, discharge, redness and visual disturbance.   Respiratory: Negative for apnea, cough, choking, chest tightness, shortness of breath, wheezing and stridor.    Cardiovascular: Negative for chest pain, palpitations and leg swelling.   Gastrointestinal: Negative for abdominal distention, change in bowel habit, diarrhea, rectal pain, vomiting, fecal incontinence and change in bowel habit.   Endocrine: Negative for cold intolerance, heat intolerance, polydipsia, polyphagia and polyuria.   Genitourinary: Negative for bladder incontinence, dysuria, flank pain, frequency and hot flashes.   Musculoskeletal: Positive for back pain, leg pain and neck pain.   Integumentary:  Negative for color change, pallor and rash.   Allergic/Immunologic: Negative for immunocompromised state.   Neurological: Negative for dizziness, vertigo, seizures, syncope, facial asymmetry, speech difficulty, light-headedness, disturbances in coordination, memory loss and coordination difficulties.   Hematological: Negative for adenopathy. Does not  "bruise/bleed easily.   Psychiatric/Behavioral: Negative for agitation, behavioral problems, confusion, decreased concentration, dysphoric mood, hallucinations, self-injury and suicidal ideas. The patient is not nervous/anxious and is not hyperactive.             Objective:  Vitals:    01/20/22 1058   BP: (!) 142/90   Pulse: (!) 113   Resp: 17   Weight: 89.8 kg (198 lb)   Height: 5' 2" (1.575 m)   PainSc:   7         Physical Exam  Vitals and nursing note reviewed. Exam conducted with a chaperone present.   Constitutional:       General: She is awake.      Appearance: Normal appearance. She is not diaphoretic.   HENT:      Head: Normocephalic and atraumatic.      Nose: Nose normal.      Mouth/Throat:      Mouth: Mucous membranes are moist.      Pharynx: Oropharynx is clear.   Eyes:      Conjunctiva/sclera: Conjunctivae normal.      Pupils: Pupils are equal, round, and reactive to light.   Cardiovascular:      Rate and Rhythm: Normal rate.   Pulmonary:      Effort: Pulmonary effort is normal. No respiratory distress.   Abdominal:      Palpations: Abdomen is soft.      Tenderness: There is no guarding.   Musculoskeletal:         General: No signs of injury. Normal range of motion.      Cervical back: Normal range of motion and neck supple. No rigidity.   Skin:     General: Skin is warm and dry.      Coloration: Skin is not jaundiced or pale.   Neurological:      General: No focal deficit present.      Mental Status: She is alert and oriented to person, place, and time. Mental status is at baseline.      Cranial Nerves: Cranial nerves are intact. No cranial nerve deficit (II-XII).   Psychiatric:         Mood and Affect: Mood normal.         Behavior: Behavior normal. Behavior is cooperative.         Thought Content: Thought content normal.           No orders of the defined types were placed in this encounter.       X-Ray Chest PA And Lateral  Narrative: EXAMINATION:  XR CHEST PA AND LATERAL    CLINICAL HISTORY:  Cough, " unspecified    COMPARISON:  11/16/2015, 04/05/2021, 08/04/2021, and 11/23/2021    FINDINGS:  The cardiac size is upper limits of normal.  Diffuse peripheral interstitial infiltrates are present the which have progressed since 2015 but not significantly changed since the most recent examinations.  No adenopathy or pleural effusion is seen.  Bony structures are normal.  Impression: Diffuse bilateral interstitial lung disease is seen involving the mid to lower lung areas with a similar appearance to the previous studies in 2021.    Place of service: [facility]    Electronically signed by: Jacklyn Ludwig  Date:    12/17/2021  Time:    11:10       Office Visit on 12/22/2021   Component Date Value Ref Range Status    POC Amphetamines 12/22/2021 Negative  Negative, Inconclusive Final    POC Barbiturates 12/22/2021 Negative  Negative, Inconclusive Final    POC Benzodiazepines 12/22/2021 Negative  Negative, Inconclusive Final    POC Cocaine 12/22/2021 Negative  Negative, Inconclusive Final    POC THC 12/22/2021 Negative  Negative, Inconclusive Final    POC Methadone 12/22/2021 Negative  Negative, Inconclusive Final    POC Methamphetamine 12/22/2021 Negative  Negative, Inconclusive Final    POC Opiates 12/22/2021 Negative  Negative, Inconclusive Final    POC Oxycodone 12/22/2021 Negative  Negative, Inconclusive Final    POC Phencyclidine 12/22/2021 Negative  Negative, Inconclusive Final    POC Methylenedioxymethamphetamine * 12/22/2021 Negative  Negative, Inconclusive Final    POC Tricyclic Antidepressants 12/22/2021 Negative  Negative, Inconclusive Final    POC Buprenorphine 12/22/2021 Negative   Final     Acceptable 12/22/2021 Yes   Final    POC Temperature (Urine) 12/22/2021 94   Final    pH, UA 12/22/2021 6.0  5.0, 5.5, 6.0, 6.5, 7.0, 7.5, 8.0 pH Units Final    Specific Gravity, UA 12/22/2021 1.025  <=1.005, 1.010, 1.015, 1.020, 1.025, 1.030 Final    Creatinine, Urine  12/22/2021 230* 28 - 219 mg/dL Final    6-Acetylmorphine 12/22/2021 Negative  10 ng/mL Final    7-Aminoclonazepam 12/22/2021 Negative  Negative 25 ng/mL Final    a-Hydroxyalprazolam 12/22/2021 Negative  25 ng/mL Final    Acetyl Fentanyl 12/22/2021 Negative  2.5 ng/mL Final    Acetyl Norfentanyl Oxalate 12/22/2021 Negative  5 ng/mL Final    Benzoylecgonine 12/22/2021 Negative  100 ng/mL Final    Buprenorphine 12/22/2021 Negative  25 ng/mL Final    Codeine 12/22/2021 Negative  25 ng/mL Final    EDDP 12/22/2021 Negative  25 ng/mL Final    Fentanyl 12/22/2021 Negative  2.5 ng/mL Final    Hydrocodone 12/22/2021 Negative  25 ng/mL Final    Hydromorphone 12/22/2021 Negative  25 ng/mL Final    Lorazepam 12/22/2021 Negative  25 ng/mL Final    Morphine 12/22/2021 Negative  25 ng/mL Final    Norbuprenorphine 12/22/2021 Negative  25 ng/mL Final    Nordiazepam 12/22/2021 Negative  25 ng/mL Final    Norfentanyl Oxalate 12/22/2021 Negative  5 ng/mL Final    Norhydrocodone 12/22/2021 Negative  50 ng/mL Final    Noroxycodone HCL 12/22/2021 258.3* <50.0 50 ng/mL Final    Oxazepam 12/22/2021 Negative  25 ng/mL Final    Oxycodone 12/22/2021 29.8* <25.0 25 ng/mL Final    Oxymorphone 12/22/2021 Negative  25 ng/mL Final    Tapentadol 12/22/2021 Negative  25 ng/mL Final    Temazepam 12/22/2021 Negative  25 ng/mL Final    THC-COOH 12/22/2021 Negative  25 ng/mL Final    Tramadol 12/22/2021 Negative  100 ng/mL Final    Amphetamine, Urine 12/22/2021 Negative  Negative 100 ng/mL Final    Methamphetamines, Urine 12/22/2021 Negative  Negative 100 ng/mL Final    Methadone, Urine 12/22/2021 Negative  Negative 25 ng/mL Final   Office Visit on 12/15/2021   Component Date Value Ref Range Status    Sodium 12/15/2021 140  136 - 145 mmol/L Final    Potassium 12/15/2021 4.0  3.5 - 5.1 mmol/L Final    Chloride 12/15/2021 108* 98 - 107 mmol/L Final    CO2 12/15/2021 27  21 - 32 mmol/L Final    Anion Gap 12/15/2021 9  7 - 16  mmol/L Final    Glucose 12/15/2021 88  74 - 106 mg/dL Final    BUN 12/15/2021 14  7 - 18 mg/dL Final    Creatinine 12/15/2021 1.03* 0.55 - 1.02 mg/dL Final    BUN/Creatinine Ratio 12/15/2021 14  6 - 20 Final    Calcium 12/15/2021 9.3  8.5 - 10.1 mg/dL Final    Total Protein 12/15/2021 7.4  6.4 - 8.2 g/dL Final    Albumin 12/15/2021 3.3* 3.5 - 5.0 g/dL Final    Globulin 12/15/2021 4.1* 2.0 - 4.0 g/dL Final    A/G Ratio 12/15/2021 0.8   Final    Bilirubin, Total 12/15/2021 0.6  0.0 - 1.2 mg/dL Final    Alk Phos 12/15/2021 91  37 - 98 U/L Final    ALT 12/15/2021 46  13 - 56 U/L Final    AST 12/15/2021 22  15 - 37 U/L Final    eGFR  12/15/2021 76  >=60 mL/min/1.73m² Final    WBC 12/15/2021 11.42* 4.50 - 11.00 K/uL Final    RBC 12/15/2021 4.94  4.20 - 5.40 M/uL Final    Hemoglobin 12/15/2021 13.7  12.0 - 16.0 g/dL Final    Hematocrit 12/15/2021 42.7  38.0 - 47.0 % Final    MCV 12/15/2021 86.4  80.0 - 96.0 fL Final    MCH 12/15/2021 27.7  27.0 - 31.0 pg Final    MCHC 12/15/2021 32.1  32.0 - 36.0 g/dL Final    RDW 12/15/2021 15.2* 11.5 - 14.5 % Final    Platelet Count 12/15/2021 360  150 - 400 K/uL Final    MPV 12/15/2021 10.2  9.4 - 12.4 fL Final    Neutrophils % 12/15/2021 70.7* 53.0 - 65.0 % Final    Lymphocytes % 12/15/2021 16.1* 27.0 - 41.0 % Final    Monocytes % 12/15/2021 10.9* 2.0 - 6.0 % Final    Eosinophils % 12/15/2021 1.3  1.0 - 4.0 % Final    Basophils % 12/15/2021 0.3  0.0 - 1.0 % Final    Immature Granulocytes % 12/15/2021 0.7* 0.0 - 0.4 % Final    nRBC, Auto 12/15/2021 0.0  <=0.0 % Final    Neutrophils, Abs 12/15/2021 8.08* 1.80 - 7.70 K/uL Final    Lymphocytes, Absolute 12/15/2021 1.84  1.00 - 4.80 K/uL Final    Monocytes, Absolute 12/15/2021 1.24* 0.00 - 0.80 K/uL Final    Eosinophils, Absolute 12/15/2021 0.15  0.00 - 0.50 K/uL Final    Basophils, Absolute 12/15/2021 0.03  0.00 - 0.20 K/uL Final    Immature Granulocytes, Absolute 12/15/2021 0.08* 0.00 -  0.04 K/uL Final    nRBC, Absolute 12/15/2021 0.00  <=0.00 x10e3/uL Final    Diff Type 12/15/2021 Auto   Final   Admission on 11/23/2021, Discharged on 11/23/2021   Component Date Value Ref Range Status    Sodium 11/23/2021 145  136 - 145 mmol/L Final    Potassium 11/23/2021 3.6  3.5 - 5.1 mmol/L Final    Chloride 11/23/2021 106  98 - 107 mmol/L Final    CO2 11/23/2021 26  21 - 32 mmol/L Final    Anion Gap 11/23/2021 17* 7 - 16 mmol/L Final    Glucose 11/23/2021 96  74 - 106 mg/dL Final    BUN 11/23/2021 14  7 - 18 mg/dL Final    Creatinine 11/23/2021 1.12* 0.55 - 1.02 mg/dL Final    BUN/Creatinine Ratio 11/23/2021 13  6 - 20 Final    Calcium 11/23/2021 9.3  8.5 - 10.1 mg/dL Final    Total Protein 11/23/2021 7.7  6.4 - 8.2 g/dL Final    Albumin 11/23/2021 3.5  3.5 - 5.0 g/dL Final    Globulin 11/23/2021 4.2* 2.0 - 4.0 g/dL Final    A/G Ratio 11/23/2021 0.8   Final    Bilirubin, Total 11/23/2021 0.3  0.0 - 1.2 mg/dL Final    Alk Phos 11/23/2021 90  37 - 98 U/L Final    ALT 11/23/2021 30  13 - 56 U/L Final    AST 11/23/2021 19  15 - 37 U/L Final    eGFR  11/23/2021 69  >=60 mL/min/1.73m² Final    Troponin I High Sensitivity 11/23/2021 6.9  <=60.4 pg/mL Final    Magnesium 11/23/2021 1.9  1.7 - 2.3 mg/dL Final    WBC 11/23/2021 9.42  4.50 - 11.00 K/uL Final    RBC 11/23/2021 5.27  4.20 - 5.40 M/uL Final    Hemoglobin 11/23/2021 14.1  12.0 - 16.0 g/dL Final    Hematocrit 11/23/2021 43.9  38.0 - 47.0 % Final    MCV 11/23/2021 83.3  80.0 - 96.0 fL Final    MCH 11/23/2021 26.8* 27.0 - 31.0 pg Final    MCHC 11/23/2021 32.1  32.0 - 36.0 g/dL Final    RDW 11/23/2021 14.7* 11.5 - 14.5 % Final    Platelet Count 11/23/2021 512* 150 - 400 K/uL Final    MPV 11/23/2021 9.9  9.4 - 12.4 fL Final    Neutrophils % 11/23/2021 44.2* 53.0 - 65.0 % Final    Lymphocytes % 11/23/2021 34.8  27.0 - 41.0 % Final    Neutrophils, Abs 11/23/2021 4.16  1.80 - 7.70 K/uL Final    Lymphocytes,  Absolute 11/23/2021 3.28  1.00 - 4.80 K/uL Final    Diff Type 11/23/2021 Auto   Final    Monocytes % 11/23/2021 15.9* 2.0 - 6.0 % Final    Eosinophils % 11/23/2021 4.4* 1.0 - 4.0 % Final    Basophils % 11/23/2021 0.2  0.0 - 1.0 % Final    Immature Granulocytes % 11/23/2021 0.5* 0.0 - 0.4 % Final    Monocytes, Absolute 11/23/2021 1.50* 0.00 - 0.80 K/uL Final    Eosinophils, Absolute 11/23/2021 0.41  0.00 - 0.50 K/uL Final    Immature Granulocytes, Absolute 11/23/2021 0.05* 0.00 - 0.04 K/uL Final    Basophils, Absolute 11/23/2021 0.02  0.00 - 0.20 K/uL Final   Lab Requisition on 10/05/2021   Component Date Value Ref Range Status    Sodium 10/05/2021 140  136 - 145 mmol/L Final    Potassium 10/05/2021 4.2  3.5 - 5.1 mmol/L Final    Chloride 10/05/2021 106  98 - 107 mmol/L Final    CO2 10/05/2021 28  21 - 32 mmol/L Final    Anion Gap 10/05/2021 10  7 - 16 mmol/L Final    Glucose 10/05/2021 105  74 - 106 mg/dL Final    BUN 10/05/2021 16  7 - 18 mg/dL Final    Creatinine 10/05/2021 1.07* 0.55 - 1.02 mg/dL Final    BUN/Creatinine Ratio 10/05/2021 15  6 - 20 Final    Calcium 10/05/2021 9.8  8.5 - 10.1 mg/dL Final    eGFR  10/05/2021 73  >=60 mL/min/1.73m² Final    Total Protein 10/05/2021 7.6  6.4 - 8.2 g/dL Final    Albumin 10/05/2021 3.7  3.5 - 5.0 g/dL Final    Bilirubin, Total 10/05/2021 0.2  >0.0 - 1.2 mg/dL Final    Bilirubin, Direct 10/05/2021 <0.1  0.0 - 0.2 mg/dL Final    AST 10/05/2021 16  15 - 37 U/L Final    ALT 10/05/2021 24  13 - 56 U/L Final    Alk Phos 10/05/2021 90  37 - 98 U/L Final    Amylase 10/05/2021 64  25 - 115 U/L Final    Lipase 10/05/2021 137  73 - 393 U/L Final    WBC 10/05/2021 11.53* 4.50 - 11.00 K/uL Final    RBC 10/05/2021 5.09  4.20 - 5.40 M/uL Final    Hemoglobin 10/05/2021 14.2  12.0 - 16.0 g/dL Final    Hematocrit 10/05/2021 44.0  38.0 - 47.0 % Final    MCV 10/05/2021 86.4  80.0 - 96.0 fL Final    MCH 10/05/2021 27.9  27.0 - 31.0 pg Final     MCHC 10/05/2021 32.3  32.0 - 36.0 g/dL Final    RDW 10/05/2021 14.1  11.5 - 14.5 % Final    Platelet Count 10/05/2021 395  150 - 400 K/uL Final    MPV 10/05/2021 10.1  9.4 - 12.4 fL Final    Neutrophils % 10/05/2021 69.4* 53.0 - 65.0 % Final    Lymphocytes % 10/05/2021 17.4* 27.0 - 41.0 % Final    Monocytes % 10/05/2021 9.9* 2.0 - 6.0 % Final    Eosinophils % 10/05/2021 2.4  1.0 - 4.0 % Final    Basophils % 10/05/2021 0.4  0.0 - 1.0 % Final    Immature Granulocytes % 10/05/2021 0.5* 0.0 - 0.4 % Final    nRBC, Auto 10/05/2021 0.0  <=0.0 % Final    Neutrophils, Abs 10/05/2021 7.99* 1.80 - 7.70 K/uL Final    Lymphocytes, Absolute 10/05/2021 2.01  1.00 - 4.80 K/uL Final    Monocytes, Absolute 10/05/2021 1.14* 0.00 - 0.80 K/uL Final    Eosinophils, Absolute 10/05/2021 0.28  0.00 - 0.50 K/uL Final    Basophils, Absolute 10/05/2021 0.05  0.00 - 0.20 K/uL Final    Immature Granulocytes, Absolute 10/05/2021 0.06* 0.00 - 0.04 K/uL Final    nRBC, Absolute 10/05/2021 0.00  <=0.00 x10e3/uL Final    Diff Type 10/05/2021 Auto   Final   Office Visit on 09/27/2021   Component Date Value Ref Range Status    POC Amphetamines 09/27/2021 Negative  Negative, Inconclusive Final    POC Barbiturates 09/27/2021 Negative  Negative, Inconclusive Final    POC Benzodiazepines 09/27/2021 Negative  Negative, Inconclusive Final    POC Cocaine 09/27/2021 Negative  Negative, Inconclusive Final    POC THC 09/27/2021 Negative  Negative, Inconclusive Final    POC Methadone 09/27/2021 Negative  Negative, Inconclusive Final    POC Methamphetamine 09/27/2021 Negative  Negative, Inconclusive Final    POC Opiates 09/27/2021 Presumptive Positive* Negative, Inconclusive Final    POC Oxycodone 09/27/2021 Negative  Negative, Inconclusive Final    POC Phencyclidine 09/27/2021 Negative  Negative, Inconclusive Final    POC Methylenedioxymethamphetamine * 09/27/2021 Negative  Negative, Inconclusive Final    POC Tricyclic  Antidepressants 09/27/2021 Negative  Negative, Inconclusive Final    POC Buprenorphine 09/27/2021 Negative   Final     Acceptable 09/27/2021 Yes   Final    POC Temperature (Urine) 09/27/2021 94   Final         Assessment:      1. Polymyositis with myopathy    2. Lumbosacral spondylosis without myelopathy    3. Generalized abdominal pain    4. Idiopathic chronic pancreatitis            A's of Opioid Risk Assessment  Activity:Patient can perform ADL.   Analgesia:Patients pain is partially controlled by current medication. Patient has tried OTC medications such as Tylenol and Ibuprofen with out relief.   Adverse Effects: Patient denies constipation or sedation.  Aberrant Behavior:  reviewed with no aberrant drug seeking/taking behavior.  Overdose reversal drug naloxone discussed    Drug screen reviewed      Requested Prescriptions     Signed Prescriptions Disp Refills    oxyCODONE-acetaminophen (PERCOCET) 7.5-325 mg per tablet 90 tablet 0     Sig: Take 1 tablet by mouth every 8 (eight) hours.         Plan:    Pharmacy closed on weekends    She may pick her medication up January 21st    Will use January 23rd as refill date    She verbalized understanding not to start taking her medication until January 23rd    Following rheumatologyFitz IV therapy, polymyositis    Due to her autoimmune suppressant medication she has acquired tuberculosis currently being treated    Chronic pancreatitis, polymyositis    Patient states she is taking her medication as directed    Continue home exercise program as directed    Continue current medication    Follow-up 1 month    Dr. Matthew, February 2022    Bring original prescription medication bottles/container/box with labels to each visit

## 2022-01-20 ENCOUNTER — OFFICE VISIT (OUTPATIENT)
Dept: PAIN MEDICINE | Facility: CLINIC | Age: 42
End: 2022-01-20
Payer: MEDICARE

## 2022-01-20 VITALS
HEIGHT: 62 IN | SYSTOLIC BLOOD PRESSURE: 142 MMHG | RESPIRATION RATE: 17 BRPM | WEIGHT: 198 LBS | HEART RATE: 113 BPM | DIASTOLIC BLOOD PRESSURE: 90 MMHG | BODY MASS INDEX: 36.44 KG/M2

## 2022-01-20 DIAGNOSIS — M33.22 POLYMYOSITIS WITH MYOPATHY: Primary | Chronic | ICD-10-CM

## 2022-01-20 DIAGNOSIS — R10.84 GENERALIZED ABDOMINAL PAIN: Chronic | ICD-10-CM

## 2022-01-20 DIAGNOSIS — K86.1 IDIOPATHIC CHRONIC PANCREATITIS: Chronic | ICD-10-CM

## 2022-01-20 DIAGNOSIS — M47.817 LUMBOSACRAL SPONDYLOSIS WITHOUT MYELOPATHY: Chronic | ICD-10-CM

## 2022-01-20 PROCEDURE — 99214 OFFICE O/P EST MOD 30 MIN: CPT | Mod: PBBFAC | Performed by: PHYSICIAN ASSISTANT

## 2022-01-20 PROCEDURE — 99214 OFFICE O/P EST MOD 30 MIN: CPT | Mod: S$PBB,,, | Performed by: PHYSICIAN ASSISTANT

## 2022-01-20 PROCEDURE — 99214 PR OFFICE/OUTPT VISIT, EST, LEVL IV, 30-39 MIN: ICD-10-PCS | Mod: S$PBB,,, | Performed by: PHYSICIAN ASSISTANT

## 2022-01-20 RX ORDER — OXYCODONE AND ACETAMINOPHEN 7.5; 325 MG/1; MG/1
1 TABLET ORAL EVERY 8 HOURS
Qty: 90 TABLET | Refills: 0 | Status: SHIPPED | OUTPATIENT
Start: 2022-01-21 | End: 2022-02-20

## 2022-01-20 NOTE — PATIENT INSTRUCTIONS
Patient Education       Spondylolysis   About this topic   The spine is made up of bones called vertebrae. These bones are lined up on top of each other. The spinal bones have a large solid part called the body. There is also a keisha arch that goes around your spinal cord. Spondylolysis is a tiny crack, or stress fracture, in this keisha arch. This problem almost always happens in the lower back or the lumbar spine. This arch can crack on one or both sides. If it happens on both sides and it is not treated, it can lead to a more serious problem.     What are the causes?   · Too much stress on the bones in the spine. This often comes from playing sports like gymnastics, football, or weight-lifting.  · Having thin bones. This may be from birth.  · Trauma  · Degenerative problem  · Being overweight  What can make this more likely to happen?   · More common in children and teenagers  · Growth spurts  · Using poor techniques or improper equipment for sports or exercise  What are the main signs?   · Low back pain that is worse with activity and better with rest  · Pain in the buttocks, back of the thighs that may shoot down the leg  · Muscle spasms or tightness in the back or back of the thigh  · Some people have no signs  How does the doctor diagnose this health problem?   The doctor will do an exam and feel around your back. The doctor may have you move and push and pull on your legs to test your motion and strength. Your doctor may have you raise one leg straight up to check if the muscles in the back of your thigh are tight. Your doctor may check the feeling and reflexes in your legs to check for nerve problems. The doctor may order:  · X-ray  · CT or MRI scan  How does the doctor treat this health problem?   · Rest  · Back brace  · Exercises for strengthening and stretching  · Physical therapy (PT) for treatments to lessen pain and for instruction in exercises to help the problem  · Weight loss program if you are  overweight  · Surgery may be needed if pain does not get better or if other problems happen  What drugs may be needed?   The doctor may order drugs to:  · Help with pain and swelling  The doctor may give you a shot to help with pain and swelling. Talk with your doctor about the risks of this shot.   What problems could happen?   · A spinal bone could slip forward onto the spinal bone below. This is spondylolisthesis.  · Chronic back pain  · Nerve damage  · Need for surgery  What can be done to prevent this health problem?   · Stay active and work out to keep your muscles strong and flexible. Keep your back and belly muscles strong and your hamstring muscles flexible.  · Warm up slowly and stretch before you exercise. Use good training techniques and form for sports. Have an expert look at your technique.  · Wear the right equipment when playing sports.  · Use good ways to train, such as slowly adding to how many exercises you do.  · Take breaks often when doing things that use repeat movements.  · Do not exercise or play sports when you are tired or in pain.  · Use extra care in sports with a lot of back bending such as gymnastics, dancing, football, and wrestling.  · Eat a healthy diet to keep your muscles and bones healthy. Eat a diet rich in calcium and vitamin D to keep your bones strong.  · Keep a healthy weight so there is not extra stress on your joints.  Helpful tips   · If you have back pain, do not ignore it. Go to the doctor. The earlier this problem is treated, the better the results.  · Try swimming and biking to stay in shape. These activities put less stress on your back.  Where can I learn more?   [employer]  https://kidshealth.org/en/parents/spondylolysis.html#kha_12   Last Reviewed Date   2020-03-16  Consumer Information Use and Disclaimer   This information is not specific medical advice and does not replace information you receive from your health care provider. This is only a brief summary of  general information. It does NOT include all information about conditions, illnesses, injuries, tests, procedures, treatments, therapies, discharge instructions or life-style choices that may apply to you. You must talk with your health care provider for complete information about your health and treatment options. This information should not be used to decide whether or not to accept your health care providers advice, instructions or recommendations. Only your health care provider has the knowledge and training to provide advice that is right for you.  Copyright   Copyright © 2021 [employer] and its affiliates and/or licensors. All rights reserved.

## 2022-02-04 ENCOUNTER — OFFICE VISIT (OUTPATIENT)
Dept: PRIMARY CARE CLINIC | Facility: CLINIC | Age: 42
End: 2022-02-04
Payer: MEDICARE

## 2022-02-04 VITALS
WEIGHT: 199 LBS | BODY MASS INDEX: 36.62 KG/M2 | HEART RATE: 100 BPM | SYSTOLIC BLOOD PRESSURE: 160 MMHG | DIASTOLIC BLOOD PRESSURE: 98 MMHG | OXYGEN SATURATION: 96 % | TEMPERATURE: 97 F | HEIGHT: 62 IN

## 2022-02-04 DIAGNOSIS — Z79.899 HIGH RISK MEDICATION USE: ICD-10-CM

## 2022-02-04 DIAGNOSIS — M33.20 POLYMYOSITIS: Primary | ICD-10-CM

## 2022-02-04 DIAGNOSIS — G44.40 DRUG-INDUCED HEADACHE, NOT ELSEWHERE CLASSIFIED, NOT INTRACTABLE: ICD-10-CM

## 2022-02-04 LAB
ALBUMIN SERPL BCP-MCNC: 3.5 G/DL (ref 3.5–5)
ALBUMIN/GLOB SERPL: 0.9 {RATIO}
ALP SERPL-CCNC: 86 U/L (ref 37–98)
ALT SERPL W P-5'-P-CCNC: 41 U/L (ref 13–56)
ANION GAP SERPL CALCULATED.3IONS-SCNC: 10 MMOL/L (ref 7–16)
AST SERPL W P-5'-P-CCNC: 24 U/L (ref 15–37)
BASOPHILS # BLD AUTO: 0.04 K/UL (ref 0–0.2)
BASOPHILS NFR BLD AUTO: 0.4 % (ref 0–1)
BILIRUB SERPL-MCNC: 0.4 MG/DL (ref 0–1.2)
BUN SERPL-MCNC: 12 MG/DL (ref 7–18)
BUN/CREAT SERPL: 12 (ref 6–20)
CALCIUM SERPL-MCNC: 9.3 MG/DL (ref 8.5–10.1)
CHLORIDE SERPL-SCNC: 111 MMOL/L (ref 98–107)
CO2 SERPL-SCNC: 28 MMOL/L (ref 21–32)
CREAT SERPL-MCNC: 0.99 MG/DL (ref 0.55–1.02)
DIFFERENTIAL METHOD BLD: ABNORMAL
EOSINOPHIL # BLD AUTO: 0.22 K/UL (ref 0–0.5)
EOSINOPHIL NFR BLD AUTO: 2.2 % (ref 1–4)
ERYTHROCYTE [DISTWIDTH] IN BLOOD BY AUTOMATED COUNT: 15.9 % (ref 11.5–14.5)
GLOBULIN SER-MCNC: 3.8 G/DL (ref 2–4)
GLUCOSE SERPL-MCNC: 91 MG/DL (ref 74–106)
HCT VFR BLD AUTO: 43.6 % (ref 38–47)
HGB BLD-MCNC: 13.9 G/DL (ref 12–16)
IMM GRANULOCYTES # BLD AUTO: 0.04 K/UL (ref 0–0.04)
IMM GRANULOCYTES NFR BLD: 0.4 % (ref 0–0.4)
LYMPHOCYTES # BLD AUTO: 2.58 K/UL (ref 1–4.8)
LYMPHOCYTES NFR BLD AUTO: 25.7 % (ref 27–41)
MCH RBC QN AUTO: 27.7 PG (ref 27–31)
MCHC RBC AUTO-ENTMCNC: 31.9 G/DL (ref 32–36)
MCV RBC AUTO: 87 FL (ref 80–96)
MONOCYTES # BLD AUTO: 1.69 K/UL (ref 0–0.8)
MONOCYTES NFR BLD AUTO: 16.8 % (ref 2–6)
MPC BLD CALC-MCNC: 10.2 FL (ref 9.4–12.4)
NEUTROPHILS # BLD AUTO: 5.46 K/UL (ref 1.8–7.7)
NEUTROPHILS NFR BLD AUTO: 54.5 % (ref 53–65)
NRBC # BLD AUTO: 0 X10E3/UL
NRBC, AUTO (.00): 0 %
PLATELET # BLD AUTO: 433 K/UL (ref 150–400)
POTASSIUM SERPL-SCNC: 3.8 MMOL/L (ref 3.5–5.1)
PROT SERPL-MCNC: 7.3 G/DL (ref 6.4–8.2)
RBC # BLD AUTO: 5.01 M/UL (ref 4.2–5.4)
SODIUM SERPL-SCNC: 145 MMOL/L (ref 136–145)
WBC # BLD AUTO: 10.03 K/UL (ref 4.5–11)

## 2022-02-04 PROCEDURE — 96372 THER/PROPH/DIAG INJ SC/IM: CPT | Mod: ,,, | Performed by: FAMILY MEDICINE

## 2022-02-04 PROCEDURE — 80053 COMPREHENSIVE METABOLIC PANEL: ICD-10-PCS | Mod: ,,, | Performed by: CLINICAL MEDICAL LABORATORY

## 2022-02-04 PROCEDURE — 96372 PR INJECTION,THERAP/PROPH/DIAG2ST, IM OR SUBCUT: ICD-10-PCS | Mod: ,,, | Performed by: FAMILY MEDICINE

## 2022-02-04 PROCEDURE — 99213 OFFICE O/P EST LOW 20 MIN: CPT | Mod: ,,, | Performed by: FAMILY MEDICINE

## 2022-02-04 PROCEDURE — 85025 COMPLETE CBC W/AUTO DIFF WBC: CPT | Mod: ,,, | Performed by: CLINICAL MEDICAL LABORATORY

## 2022-02-04 PROCEDURE — 85025 CBC WITH DIFFERENTIAL: ICD-10-PCS | Mod: ,,, | Performed by: CLINICAL MEDICAL LABORATORY

## 2022-02-04 PROCEDURE — 80053 COMPREHEN METABOLIC PANEL: CPT | Mod: ,,, | Performed by: CLINICAL MEDICAL LABORATORY

## 2022-02-04 PROCEDURE — 99213 PR OFFICE/OUTPT VISIT, EST, LEVL III, 20-29 MIN: ICD-10-PCS | Mod: ,,, | Performed by: FAMILY MEDICINE

## 2022-02-04 RX ORDER — KETOROLAC TROMETHAMINE 30 MG/ML
60 INJECTION, SOLUTION INTRAMUSCULAR; INTRAVENOUS
Status: COMPLETED | OUTPATIENT
Start: 2022-02-04 | End: 2022-02-04

## 2022-02-04 RX ORDER — PROMETHAZINE HYDROCHLORIDE 25 MG/ML
25 INJECTION, SOLUTION INTRAMUSCULAR; INTRAVENOUS
Status: COMPLETED | OUTPATIENT
Start: 2022-02-04 | End: 2022-02-04

## 2022-02-04 RX ADMIN — PROMETHAZINE HYDROCHLORIDE 25 MG: 25 INJECTION, SOLUTION INTRAMUSCULAR; INTRAVENOUS at 09:02

## 2022-02-04 RX ADMIN — KETOROLAC TROMETHAMINE 60 MG: 30 INJECTION, SOLUTION INTRAMUSCULAR; INTRAVENOUS at 09:02

## 2022-02-04 NOTE — PROGRESS NOTES
Subjective:       Patient ID: Alanna Briggs is a 41 y.o. female.    Chief Complaint: Migraine (Onset yesterday with nausea)      Pt. With headache. Received rituxan again yesterday. Still taking TB treatment. Discussion.    Migraine   Pertinent negatives include no coughing, fever, nausea or vomiting.     Review of Systems   Constitutional: Negative for fatigue and fever.   HENT: Negative for nasal congestion and dental problem.    Eyes: Negative for discharge.   Respiratory: Negative for cough and shortness of breath.    Cardiovascular: Negative for chest pain.   Gastrointestinal: Negative for constipation, diarrhea, nausea and vomiting.   Genitourinary: Negative for bladder incontinence, difficulty urinating and hot flashes.   Musculoskeletal: Positive for arthralgias and myalgias.   Allergic/Immunologic: Negative for environmental allergies.   Neurological: Positive for headaches.   Psychiatric/Behavioral: Negative for behavioral problems and confusion.         Objective:      Physical Exam  Vitals and nursing note reviewed.   Constitutional:       Appearance: Normal appearance. She is normal weight.   HENT:      Head: Normocephalic and atraumatic.      Right Ear: Tympanic membrane normal.      Left Ear: Tympanic membrane normal.      Nose: Nose normal.      Mouth/Throat:      Mouth: Mucous membranes are moist.   Eyes:      Extraocular Movements: Extraocular movements intact.      Conjunctiva/sclera: Conjunctivae normal.      Pupils: Pupils are equal, round, and reactive to light.   Cardiovascular:      Rate and Rhythm: Normal rate and regular rhythm.      Pulses: Normal pulses.   Pulmonary:      Effort: Pulmonary effort is normal.      Breath sounds: Normal breath sounds.   Abdominal:      General: Abdomen is flat. Bowel sounds are normal.      Palpations: Abdomen is soft.   Musculoskeletal:         General: Normal range of motion.      Cervical back: Normal range of motion and neck supple.      Comments:  Multiple site arthritis   Skin:     General: Skin is warm and dry.   Neurological:      General: No focal deficit present.      Mental Status: She is alert and oriented to person, place, and time.   Psychiatric:         Mood and Affect: Mood normal.         Assessment:       There are no diagnoses linked to this encounter.

## 2022-02-09 ENCOUNTER — TELEPHONE (OUTPATIENT)
Dept: PRIMARY CARE CLINIC | Facility: CLINIC | Age: 42
End: 2022-02-09
Payer: MEDICARE

## 2022-02-09 NOTE — TELEPHONE ENCOUNTER
Pt notified that Dr STEELE did not report anything bad regarding her lab work. Pt voiced understanding.

## 2022-02-16 ENCOUNTER — OFFICE VISIT (OUTPATIENT)
Dept: PRIMARY CARE CLINIC | Facility: CLINIC | Age: 42
End: 2022-02-16
Payer: MEDICARE

## 2022-02-16 VITALS
WEIGHT: 199 LBS | HEIGHT: 62 IN | HEART RATE: 108 BPM | SYSTOLIC BLOOD PRESSURE: 140 MMHG | TEMPERATURE: 98 F | BODY MASS INDEX: 36.62 KG/M2 | RESPIRATION RATE: 20 BRPM | OXYGEN SATURATION: 91 % | DIASTOLIC BLOOD PRESSURE: 100 MMHG

## 2022-02-16 DIAGNOSIS — M19.90 ARTHRITIS: ICD-10-CM

## 2022-02-16 DIAGNOSIS — M33.20 POLYMYOSITIS ASSOCIATED WITH AUTOIMMUNE DISEASE: Primary | ICD-10-CM

## 2022-02-16 DIAGNOSIS — M35.9 POLYMYOSITIS ASSOCIATED WITH AUTOIMMUNE DISEASE: Primary | ICD-10-CM

## 2022-02-16 DIAGNOSIS — A15.0 TB (PULMONARY TUBERCULOSIS): ICD-10-CM

## 2022-02-16 DIAGNOSIS — J32.9 SINUSITIS, UNSPECIFIED CHRONICITY, UNSPECIFIED LOCATION: ICD-10-CM

## 2022-02-16 PROCEDURE — 99213 PR OFFICE/OUTPT VISIT, EST, LEVL III, 20-29 MIN: ICD-10-PCS | Mod: ,,, | Performed by: FAMILY MEDICINE

## 2022-02-16 PROCEDURE — 96372 THER/PROPH/DIAG INJ SC/IM: CPT | Mod: ,,, | Performed by: FAMILY MEDICINE

## 2022-02-16 PROCEDURE — 96372 PR INJECTION,THERAP/PROPH/DIAG2ST, IM OR SUBCUT: ICD-10-PCS | Mod: ,,, | Performed by: FAMILY MEDICINE

## 2022-02-16 PROCEDURE — 99213 OFFICE O/P EST LOW 20 MIN: CPT | Mod: ,,, | Performed by: FAMILY MEDICINE

## 2022-02-16 RX ORDER — FLUCONAZOLE 150 MG/1
150 TABLET ORAL DAILY
Qty: 10 TABLET | Refills: 0 | Status: SHIPPED | OUTPATIENT
Start: 2022-02-16 | End: 2022-02-26

## 2022-02-16 RX ORDER — CEPHALEXIN 500 MG/1
500 CAPSULE ORAL EVERY 8 HOURS
Qty: 30 CAPSULE | Refills: 0 | Status: SHIPPED | OUTPATIENT
Start: 2022-02-16 | End: 2022-01-01

## 2022-02-16 RX ORDER — DEXCHLORPHENIRAMINE MALEATE, DEXTROMETHORPHAN HBR, PHENYLEPHRINE HCL 1; 10; 5 MG/5ML; MG/5ML; MG/5ML
10 SYRUP ORAL
Qty: 240 ML | Refills: 1 | Status: SHIPPED | OUTPATIENT
Start: 2022-02-16 | End: 2022-01-01 | Stop reason: SDUPTHER

## 2022-02-16 RX ORDER — BETAMETHASONE SODIUM PHOSPHATE AND BETAMETHASONE ACETATE 3; 3 MG/ML; MG/ML
6 INJECTION, SUSPENSION INTRA-ARTICULAR; INTRALESIONAL; INTRAMUSCULAR; SOFT TISSUE
Status: COMPLETED | OUTPATIENT
Start: 2022-02-16 | End: 2022-02-16

## 2022-02-16 RX ORDER — CEFTRIAXONE 1 G/1
1 INJECTION, POWDER, FOR SOLUTION INTRAMUSCULAR; INTRAVENOUS
Status: COMPLETED | OUTPATIENT
Start: 2022-02-16 | End: 2022-02-16

## 2022-02-16 RX ADMIN — BETAMETHASONE SODIUM PHOSPHATE AND BETAMETHASONE ACETATE 6 MG: 3; 3 INJECTION, SUSPENSION INTRA-ARTICULAR; INTRALESIONAL; INTRAMUSCULAR; SOFT TISSUE at 09:02

## 2022-02-16 RX ADMIN — CEFTRIAXONE 1 G: 1 INJECTION, POWDER, FOR SOLUTION INTRAMUSCULAR; INTRAVENOUS at 09:02

## 2022-02-16 NOTE — PROGRESS NOTES
Subjective:       Patient ID: Alanna Briggs is a 41 y.o. female.    Chief Complaint: Generalized Body Aches, Headache, Cough, and Fever (Fever last night)      Pt. Now taking TB meds three times a week. Now with sinus congestion.    Headache   Associated symptoms include coughing, a fever and sinus pressure. Pertinent negatives include no nausea or vomiting.   Cough  Associated symptoms include a fever and headaches. Pertinent negatives include no chest pain or shortness of breath. There is no history of environmental allergies.   Fever   Associated symptoms include congestion, coughing and headaches. Pertinent negatives include no chest pain, diarrhea, nausea or vomiting.     Review of Systems   Constitutional: Positive for fever. Negative for fatigue.   HENT: Positive for nasal congestion and sinus pressure/congestion. Negative for dental problem.    Eyes: Negative for discharge.   Respiratory: Positive for cough. Negative for shortness of breath.    Cardiovascular: Negative for chest pain.   Gastrointestinal: Negative for constipation, diarrhea, nausea and vomiting.   Genitourinary: Negative for bladder incontinence, difficulty urinating and hot flashes.   Allergic/Immunologic: Negative for environmental allergies.   Neurological: Positive for headaches.   Psychiatric/Behavioral: Negative for behavioral problems and confusion.         Objective:      Physical Exam  Vitals and nursing note reviewed.   Constitutional:       Appearance: Normal appearance. She is normal weight.   HENT:      Head: Normocephalic and atraumatic.      Right Ear: Tympanic membrane normal.      Left Ear: Tympanic membrane normal.      Ears:      Comments: Both TM's gray but dull; some cerumen right     Nose: Congestion present.      Mouth/Throat:      Mouth: Mucous membranes are moist.   Eyes:      Extraocular Movements: Extraocular movements intact.      Conjunctiva/sclera: Conjunctivae normal.      Pupils: Pupils are equal, round, and  reactive to light.   Cardiovascular:      Rate and Rhythm: Normal rate and regular rhythm.      Pulses: Normal pulses.   Pulmonary:      Effort: Pulmonary effort is normal.      Breath sounds: Normal breath sounds.   Abdominal:      General: Abdomen is flat. Bowel sounds are normal.      Palpations: Abdomen is soft.   Musculoskeletal:         General: Normal range of motion.      Cervical back: Normal range of motion and neck supple.      Comments: Multiple site arthritis   Skin:     General: Skin is warm and dry.   Neurological:      General: No focal deficit present.      Mental Status: She is alert and oriented to person, place, and time.   Psychiatric:         Mood and Affect: Mood normal.         Assessment:       There are no diagnoses linked to this encounter.

## 2022-02-21 ENCOUNTER — OFFICE VISIT (OUTPATIENT)
Dept: PAIN MEDICINE | Facility: CLINIC | Age: 42
End: 2022-02-21
Payer: MEDICARE

## 2022-02-21 VITALS
BODY MASS INDEX: 36.44 KG/M2 | HEIGHT: 62 IN | HEART RATE: 102 BPM | SYSTOLIC BLOOD PRESSURE: 155 MMHG | DIASTOLIC BLOOD PRESSURE: 108 MMHG | WEIGHT: 198 LBS

## 2022-02-21 DIAGNOSIS — G89.4 CHRONIC PAIN SYNDROME: Chronic | ICD-10-CM

## 2022-02-21 DIAGNOSIS — M33.22 POLYMYOSITIS WITH MYOPATHY: Primary | Chronic | ICD-10-CM

## 2022-02-21 DIAGNOSIS — K86.1 IDIOPATHIC CHRONIC PANCREATITIS: Chronic | ICD-10-CM

## 2022-02-21 DIAGNOSIS — M47.817 LUMBOSACRAL SPONDYLOSIS WITHOUT MYELOPATHY: Chronic | ICD-10-CM

## 2022-02-21 PROCEDURE — 99214 PR OFFICE/OUTPT VISIT, EST, LEVL IV, 30-39 MIN: ICD-10-PCS | Mod: S$PBB,,, | Performed by: PAIN MEDICINE

## 2022-02-21 PROCEDURE — 99214 OFFICE O/P EST MOD 30 MIN: CPT | Mod: S$PBB,,, | Performed by: PAIN MEDICINE

## 2022-02-21 PROCEDURE — 99214 OFFICE O/P EST MOD 30 MIN: CPT | Mod: PBBFAC | Performed by: PAIN MEDICINE

## 2022-02-21 RX ORDER — OXYCODONE AND ACETAMINOPHEN 7.5; 325 MG/1; MG/1
1 TABLET ORAL EVERY 8 HOURS PRN
Qty: 90 TABLET | Refills: 0 | Status: SHIPPED | OUTPATIENT
Start: 2022-01-01 | End: 2022-01-01

## 2022-02-21 RX ORDER — OXYCODONE AND ACETAMINOPHEN 7.5; 325 MG/1; MG/1
1 TABLET ORAL EVERY 8 HOURS PRN
Qty: 90 TABLET | Refills: 0 | Status: SHIPPED | OUTPATIENT
Start: 2022-02-22 | End: 2022-01-01

## 2022-02-21 RX ORDER — OXYCODONE AND ACETAMINOPHEN 5; 325 MG/1; MG/1
1 TABLET ORAL EVERY 8 HOURS PRN
Qty: 90 TABLET | Refills: 0 | Status: CANCELLED | OUTPATIENT
Start: 2022-01-01 | End: 2022-01-01

## 2022-02-21 NOTE — PROGRESS NOTES
She Disclaimer: This note has been generated using voice-recognition software. There may be typographical errors that have been missed during proof-reading        Patient ID: Alanna Briggs is a 41 y.o. female.      Chief Complaint: No chief complaint on file.      41-year-old female returns today for re-evaluation and medication refill.  She has  a complex medical history which includes pulmonary fibrosis, polymyositis, chronic pancreatitis and recent diagnosis of  Tuberculosis,  December 2021. She reports feeling better since her last office visit. She continues to experience chronic abdominal pain from pancreatitis. She returns today for medication refill.                    A's of Opioid Risk Assessment  Activity:Patient can perform ADL.   Analgesia:Patients pain is  controlled by current medication.   Adverse Effects: Patient denies constipation or sedation.  Aberrant Behavior:  reviewed with no aberrant drug seeking/taking behavior.      Patient denies any suicidal or homicidal ideations    Physical Therapy/Home Exercise: yes      X-Ray Chest PA And Lateral  Narrative: EXAMINATION:  XR CHEST PA AND LATERAL    CLINICAL HISTORY:  Cough, unspecified    COMPARISON:  11/16/2015, 04/05/2021, 08/04/2021, and 11/23/2021    FINDINGS:  The cardiac size is upper limits of normal.  Diffuse peripheral interstitial infiltrates are present the which have progressed since 2015 but not significantly changed since the most recent examinations.  No adenopathy or pleural effusion is seen.  Bony structures are normal.  Impression: Diffuse bilateral interstitial lung disease is seen involving the mid to lower lung areas with a similar appearance to the previous studies in 2021.    [facility] of [facility]    Electronically signed by: Jacklyn Ludwig  Date:    12/17/2021  Time:    11:10      Review of Systems   Constitutional: Negative.    HENT: Negative.    Eyes: Negative.    Respiratory: Negative.     Cardiovascular: Negative.    Gastrointestinal: Negative.    Endocrine: Negative.    Genitourinary: Negative.    Musculoskeletal: Positive for arthralgias, back pain and neck pain.   Integumentary:  Negative.   Neurological: Negative.    Hematological: Negative.    Psychiatric/Behavioral: Negative.              Past Medical History:   Diagnosis Date    Anemia     Anxiety     Chronic gastritis     Chronic pain syndrome     Hypertension     Hypokalemia     Migraine     Other chronic pancreatitis     Polymyositis     Polymyositis with myopathy     Pulmonary fibrosis     Vitamin D deficiency      Past Surgical History:   Procedure Laterality Date     SECTION      CHOLECYSTECTOMY      COLONOSCOPY      ESOPHAGOGASTRODUODENOSCOPY      ESOPHAGOGASTRODUODENOSCOPY      LUNG BIOPSY       Social History     Socioeconomic History    Marital status: Single   Tobacco Use    Smoking status: Never Smoker    Smokeless tobacco: Never Used   Substance and Sexual Activity    Alcohol use: Not Currently    Drug use: Yes     Types: Oxycodone     Comment: Prescribed by Dr Hood for chronic pain at [facility]    Sexual activity: Yes     Family History   Problem Relation Age of Onset    Hypertension Mother     Heart disease Father     Cancer Maternal Grandmother     Stroke Maternal Grandmother     Arthritis Paternal Grandmother     Stroke Paternal Grandmother     Cancer Paternal Grandfather      Review of patient's allergies indicates:   Allergen Reactions    Opioids - morphine analogues Itching     has a current medication list which includes the following prescription(s): amlodipine, cephalexin, polytussin dm, fluconazole, medroxyprogesterone acetate, nebivolol, ondansetron, oxycodone-acetaminophen, rifampin, and rituximab.      Objective:  There were no vitals filed for this visit.     Physical Exam  Vitals and nursing note reviewed.   Constitutional:       General: She is not in acute  distress.     Appearance: Normal appearance. She is not ill-appearing, toxic-appearing or diaphoretic.   HENT:      Head: Normocephalic and atraumatic.      Nose: Nose normal.      Mouth/Throat:      Mouth: Mucous membranes are moist.   Eyes:      Extraocular Movements: Extraocular movements intact.      Pupils: Pupils are equal, round, and reactive to light.   Cardiovascular:      Rate and Rhythm: Normal rate and regular rhythm.      Heart sounds: Normal heart sounds.   Pulmonary:      Effort: Pulmonary effort is normal. No respiratory distress.      Breath sounds: Normal breath sounds. No stridor. No wheezing or rhonchi.   Abdominal:      General: Abdomen is flat. Bowel sounds are normal.      Palpations: Abdomen is soft.      Tenderness: There is abdominal tenderness in the periumbilical area.   Musculoskeletal:         General: No swelling or deformity. Normal range of motion.      Cervical back: Normal and normal range of motion. No spasms or tenderness. No pain with movement. Normal range of motion.      Thoracic back: Normal.      Lumbar back: Tenderness and bony tenderness present. No spasms. Normal range of motion. Negative right straight leg raise test and negative left straight leg raise test. No scoliosis.      Right lower leg: No edema.      Left lower leg: No edema.   Skin:     General: Skin is warm.   Neurological:      General: No focal deficit present.      Mental Status: She is alert and oriented to person, place, and time. Mental status is at baseline.      Cranial Nerves: Cranial nerves are intact. No cranial nerve deficit.      Sensory: Sensation is intact. No sensory deficit.      Motor: No weakness.      Coordination: Coordination normal.      Gait: Gait normal.      Deep Tendon Reflexes: Reflexes are normal and symmetric.   Psychiatric:         Mood and Affect: Mood normal.         Behavior: Behavior normal.           Assessment:      1. Polymyositis with myopathy    2. Lumbosacral  spondylosis without myelopathy    3. Chronic pain syndrome    4. Idiopathic chronic pancreatitis          Plan:  1. reviewed  2.Addiction, Dependency, Tolerance, Opioid abuse-misuse, Death, Diversion Discussed. Overdose reversal drug Naloxone discussed.  3.Refill/Continue medications for pain control and function       Requested Prescriptions     Pending Prescriptions Disp Refills    oxyCODONE-acetaminophen (PERCOCET) 7.5-325 mg per tablet 90 tablet 0     Sig: Take 1 tablet by mouth every 8 (eight) hours as needed for Pain.     4.Patient defers nerve block injections, physical therapy or surgical consultation     5.Follow with DENITA Salas in 2 months for re-evaluation and medication refill         report:  Reviewed and consistent with medication use as prescribed.      The total time spent for evaluation and management on 02/20/2022 including reviewing separately obtained history, performing a medically appropriate exam and evaluation, documenting clinical information in the health record, independently interpreting results and communicating them to the patient/family/caregiver, and ordering medications/tests/procedures was between 15-29 minutes.    The above plan and management options were discussed at length with patient. Patient is in agreement with the above and verbalized understanding. It will be communicated with the referring physician via electronic record, fax, or mail.

## 2022-03-28 NOTE — TELEPHONE ENCOUNTER
----- Message from Misha Arceo LPN sent at 3/28/2022  8:48 AM CDT -----  Regarding: FW: PAIN  MED  Please send to [facility]   ----- Message -----  From: Meghan Arceo  Sent: 3/24/2022   9:59 AM CDT  To: Er[facility]STANED[facility] Staff  Subject: PAIN  MED                                        PT.'S PHARMACY DOES NOT HAVE PAIN MED AVAILABLE. PLEASE CALL PT TO DISCUSS OPTIONS WHERE MED CAN BE SENT. PLEASE CALL [phone]

## 2022-03-28 NOTE — TELEPHONE ENCOUNTER
Pt is requesting percocet to be sent to [facility] in [place] stating per Jaret Tapia did not have pt medication.    Per , Percocet 7.5 #90 filled 2-22. Dr Matthew will send in medication.

## 2022-04-07 PROBLEM — N30.01 ACUTE CYSTITIS WITH HEMATURIA: Status: ACTIVE | Noted: 2022-01-01

## 2022-04-07 NOTE — PROGRESS NOTES
Subjective:       Patient ID: Alanna Briggs is a 41 y.o. female.    Chief Complaint: Abdominal Pain (Upper mid to left abdominal pain.)      Pt. With a UTI. Started a few days ago.    Abdominal Pain  Associated symptoms include dysuria. Pertinent negatives include no constipation, diarrhea, fever, headaches, nausea or vomiting.     Review of Systems   Constitutional: Negative for fatigue and fever.   HENT: Negative for nasal congestion and dental problem.    Eyes: Negative for discharge.   Respiratory: Negative for cough and shortness of breath.    Cardiovascular: Negative for chest pain.   Gastrointestinal: Positive for abdominal pain. Negative for constipation, diarrhea, nausea and vomiting.   Genitourinary: Positive for dysuria. Negative for bladder incontinence, difficulty urinating and hot flashes.   Allergic/Immunologic: Negative for environmental allergies.   Neurological: Negative for headaches.   Psychiatric/Behavioral: Negative for behavioral problems and confusion.         Objective:      Physical Exam  Vitals and nursing note reviewed.   Constitutional:       Appearance: Normal appearance. She is normal weight.   HENT:      Head: Normocephalic and atraumatic.      Right Ear: Tympanic membrane normal.      Left Ear: Tympanic membrane normal.      Nose: Nose normal.      Mouth/Throat:      Mouth: Mucous membranes are moist.   Eyes:      Extraocular Movements: Extraocular movements intact.      Conjunctiva/sclera: Conjunctivae normal.      Pupils: Pupils are equal, round, and reactive to light.   Cardiovascular:      Rate and Rhythm: Normal rate and regular rhythm.      Pulses: Normal pulses.   Pulmonary:      Effort: Pulmonary effort is normal.      Breath sounds: Normal breath sounds.   Abdominal:      General: Abdomen is flat. Bowel sounds are normal.      Palpations: Abdomen is soft.   Musculoskeletal:         General: Normal range of motion.      Cervical back: Normal range of motion and neck  supple.   Skin:     General: Skin is warm and dry.   Neurological:      General: No focal deficit present.      Mental Status: She is alert and oriented to person, place, and time.   Psychiatric:         Mood and Affect: Mood normal.         Assessment:       Abdominal pain, unspecified abdominal location  -     X-Ray KUB; Future; Expected date: 04/07/2022  -     POCT URINALYSIS W/O SCOPE

## 2022-04-12 NOTE — TELEPHONE ENCOUNTER
----- Message from Gila Edmond MD sent at 4/11/2022  8:38 AM CDT -----  Antibiotic needs to be changed to cipro 250mg BID if her other meds will mix with this

## 2022-04-20 PROBLEM — M33.22 POLYMYOSITIS WITH MYOPATHY: Chronic | Status: ACTIVE | Noted: 2021-06-02

## 2022-04-20 PROBLEM — M54.17 LUMBOSACRAL RADICULOPATHY: Chronic | Status: ACTIVE | Noted: 2022-01-01

## 2022-04-20 NOTE — PROGRESS NOTES
Disclaimer:  This note has been generated using voice recognition software.  There may be type of graft focal areas that have been missed during a proof reading      Subjective:      Patient ID: Alanna Briggs is a 41 y.o. female.    Chief Complaint: Low-back Pain and Abdominal Pain      Low-back Pain  This is a chronic problem. The current episode started more than 1 year ago. The problem occurs daily. The problem has been waxing and waning since onset. Associated symptoms include abdominal pain and leg pain. Pertinent negatives include no bladder incontinence, chest pain, dysuria or fever.   Abdominal Pain  Associated symptoms include myalgias. Pertinent negatives include no diarrhea, dysuria, fever, frequency or vomiting.   Pain  This is a chronic problem. The current episode started more than 1 year ago. The problem occurs daily. The problem has been waxing and waning. Associated symptoms include abdominal pain, fatigue, myalgias and neck pain. Pertinent negatives include no change in bowel habit, chest pain, coughing, fever, rash, sore throat, vertigo or vomiting.     Review of Systems   Constitutional: Positive for fatigue. Negative for activity change, appetite change, fever and unexpected weight change.   HENT: Negative for drooling, ear discharge, ear pain, facial swelling, nosebleeds, sore throat, trouble swallowing, voice change and goiter.    Eyes: Negative for photophobia, pain, discharge, redness and visual disturbance.   Respiratory: Negative for apnea, cough, choking, chest tightness, shortness of breath, wheezing and stridor.    Cardiovascular: Negative for chest pain, palpitations and leg swelling.   Gastrointestinal: Positive for abdominal pain. Negative for abdominal distention, change in bowel habit, diarrhea, rectal pain, vomiting, fecal incontinence and change in bowel habit.   Endocrine: Negative for cold intolerance, heat intolerance, polydipsia, polyphagia and polyuria.   Genitourinary:  "Negative for bladder incontinence, dysuria, flank pain, frequency and hot flashes.   Musculoskeletal: Positive for back pain, leg pain, myalgias and neck pain.   Integumentary:  Negative for color change, pallor and rash.   Allergic/Immunologic: Negative for immunocompromised state.   Neurological: Negative for dizziness, vertigo, seizures, syncope, facial asymmetry, speech difficulty, light-headedness, disturbances in coordination, memory loss and coordination difficulties.   Hematological: Negative for adenopathy. Does not bruise/bleed easily.   Psychiatric/Behavioral: Negative for agitation, behavioral problems, confusion, decreased concentration, dysphoric mood, hallucinations, self-injury and suicidal ideas. The patient is not nervous/anxious and is not hyperactive.             Objective:  Vitals:    04/20/22 1115   BP: (!) 160/100   Pulse: 101   Weight: 91.2 kg (201 lb)   Height: 5' 2" (1.575 m)   PainSc:   6         Physical Exam  Vitals and nursing note reviewed. Exam conducted with a chaperone present.   Constitutional:       General: She is awake. She is not in acute distress.     Appearance: Normal appearance. She is not diaphoretic.   HENT:      Head: Normocephalic and atraumatic.      Nose: Nose normal.      Mouth/Throat:      Mouth: Mucous membranes are moist.      Pharynx: Oropharynx is clear.   Eyes:      Conjunctiva/sclera: Conjunctivae normal.      Pupils: Pupils are equal, round, and reactive to light.   Cardiovascular:      Rate and Rhythm: Normal rate.   Pulmonary:      Effort: Pulmonary effort is normal. No respiratory distress.   Abdominal:      Palpations: Abdomen is soft.      Tenderness: There is no guarding.   Musculoskeletal:         General: Normal range of motion.      Cervical back: Normal range of motion and neck supple. Tenderness present. No rigidity.      Lumbar back: Tenderness present.   Skin:     General: Skin is warm and dry.      Coloration: Skin is not jaundiced or pale. "   Neurological:      General: No focal deficit present.      Mental Status: She is alert and oriented to person, place, and time. Mental status is at baseline.      Cranial Nerves: Cranial nerves are intact. No cranial nerve deficit (II-XII).   Psychiatric:         Mood and Affect: Mood normal.         Behavior: Behavior normal. Behavior is cooperative.         Thought Content: Thought content normal.           Orders Placed This Encounter   Procedures    X-Ray Lumbar Complete Including Flex And Ext     Standing Status:   Future     Standing Expiration Date:   4/20/2023     Order Specific Question:   Is the patient pregnant?     Answer:   No     Order Specific Question:   May the Radiologist modify the order per protocol to meet the clinical needs of the patient?     Answer:   Yes     Order Specific Question:   Does the patient have a neck collar or brace on?     Answer:   No    MRI Lumbar Spine Without Contrast     Standing Status:   Future     Standing Expiration Date:   4/20/2023     Order Specific Question:   Does the patient have a pacemaker or a defibrilator (Note: Some facilities may not be able to schedule an MRI for patients with pacemakers and defibrillators. You should contact your local radiology department to determine if this is the case.)?     Answer:   No     Order Specific Question:   Does the patient have an aneurysm or surgical clip, pump, nerve/brain stimulator, middle/inner ear prosthesis, or other metal implant or foreign object (bullet, shrapnel)? If they have a card related to their implant, ask them to bring it. Issues related to the implant may cause the MRI to be delayed.     Answer:   No     Order Specific Question:   Is the patient claustrophobic?     Answer:   No     Order Specific Question:   Will the patient require sedation?     Answer:   No     Order Specific Question:   Does the patient have any of the following conditions? Diabetes, History of Renal Disease or Hypertension  requiring medical therapy?     Answer:   No     Order Specific Question:   May the Radiologist modify the order per protocol to meet the clinical needs of the patient?     Answer:   Yes     Order Specific Question:   Is this part of a Research Study?     Answer:   No     Order Specific Question:   Recist criteria?     Answer:   No     Order Specific Question:   Will this service be billed to a Worker's Comp policy?     Answer:   No     Order Specific Question:   Does the patient have on a skin patch for medication with aluminized backing?     Answer:   No     Order Specific Question:   Is the patient pregnant?     Answer:   No    X-Ray Hips Bilateral 2 View Inc AP Pelvis     Standing Status:   Future     Standing Expiration Date:   4/20/2023     Order Specific Question:   Does the patient have a splint or a brace?     Answer:   No     Order Specific Question:   Does the patient have a cast?     Answer:   No     Order Specific Question:   Is the patient pregnant?     Answer:   No     Order Specific Question:   May the Radiologist modify the order per protocol to meet the clinical needs of the patient?     Answer:   Yes     Order Specific Question:   Release to patient     Answer:   Immediate    Drug Screen Definitive 14, Urine     Standing Status:   Future     Number of Occurrences:   1     Standing Expiration Date:   6/19/2023     Order Specific Question:   Specimen Source     Answer:   Urine    POCT Urine Drug Screen Presump     Interpretive Information:     Negative:  No drug detected at the cut off level.   Positive:  This result represents presumptive positive for the   tested drug, other substances may yield a positive response other   than the analyte of interest. This result should be utilized for   diagnostic purpose only. Confirmation testing will be performed upon physician request only.           X-Ray KUB  Narrative: EXAMINATION:  XR KUB    CLINICAL HISTORY:  Unspecified abdominal  pain    COMPARISON:  02/25/2014, 02/19/2018, and 04/19/2021    FINDINGS:  There is abundant stool but no suggestion of an obstruction.  Surgical clips are seen in the right upper quadrant.  No calcifications are seen in the abdomen or pelvis.  There is levoscoliosis in the lumbar spine.    Bibasilar interstitial lung disease is present which was documented on the previous exams of the chest, most recently dated 12/17/2021.  Impression: Abundant stool but otherwise unremarkable bowel gas pattern.    Place of service: [facility]    Electronically signed by: Jacklyn Ludwig  Date:    04/07/2022  Time:    09:28       Office Visit on 04/07/2022   Component Date Value Ref Range Status    Color, UA 04/07/2022 Yellow   Final    Spec Grav UA 04/07/2022 1.015   Final    pH, UA 04/07/2022 6.0   Final    WBC, UA 04/07/2022 large   Final    Nitrite, UA 04/07/2022 negative   Final    Protein, POC 04/07/2022 negative   Final    Glucose, UA 04/07/2022 negative   Final    Ketones, UA 04/07/2022 negative   Final    Bilirubin, POC 04/07/2022 negative   Final    Urobilinogen, UA 04/07/2022 0.2   Final    Blood, UA 04/07/2022 large   Final    Culture, Urine 04/07/2022 15,000 Enterococcus faecalis (A)  Final   Office Visit on 02/04/2022   Component Date Value Ref Range Status    Sodium 02/04/2022 145  136 - 145 mmol/L Final    Potassium 02/04/2022 3.8  3.5 - 5.1 mmol/L Final    Chloride 02/04/2022 111 (A) 98 - 107 mmol/L Final    CO2 02/04/2022 28  21 - 32 mmol/L Final    Anion Gap 02/04/2022 10  7 - 16 mmol/L Final    Glucose 02/04/2022 91  74 - 106 mg/dL Final    BUN 02/04/2022 12  7 - 18 mg/dL Final    Creatinine 02/04/2022 0.99  0.55 - 1.02 mg/dL Final    BUN/Creatinine Ratio 02/04/2022 12  6 - 20 Final    Calcium 02/04/2022 9.3  8.5 - 10.1 mg/dL Final    Total Protein 02/04/2022 7.3  6.4 - 8.2 g/dL Final    Albumin 02/04/2022 3.5  3.5 - 5.0 g/dL Final    Globulin 02/04/2022 3.8  2.0 - 4.0 g/dL  Final    A/G Ratio 02/04/2022 0.9   Final    Bilirubin, Total 02/04/2022 0.4  0.0 - 1.2 mg/dL Final    Alk Phos 02/04/2022 86  37 - 98 U/L Final    ALT 02/04/2022 41  13 - 56 U/L Final    AST 02/04/2022 24  15 - 37 U/L Final    eGFR 02/04/2022 66  >=60 mL/min/1.73m² Final    WBC 02/04/2022 10.03  4.50 - 11.00 K/uL Final    RBC 02/04/2022 5.01  4.20 - 5.40 M/uL Final    Hemoglobin 02/04/2022 13.9  12.0 - 16.0 g/dL Final    Hematocrit 02/04/2022 43.6  38.0 - 47.0 % Final    MCV 02/04/2022 87.0  80.0 - 96.0 fL Final    MCH 02/04/2022 27.7  27.0 - 31.0 pg Final    MCHC 02/04/2022 31.9 (A) 32.0 - 36.0 g/dL Final    RDW 02/04/2022 15.9 (A) 11.5 - 14.5 % Final    Platelet Count 02/04/2022 433 (A) 150 - 400 K/uL Final    MPV 02/04/2022 10.2  9.4 - 12.4 fL Final    Neutrophils % 02/04/2022 54.5  53.0 - 65.0 % Final    Lymphocytes % 02/04/2022 25.7 (A) 27.0 - 41.0 % Final    Monocytes % 02/04/2022 16.8 (A) 2.0 - 6.0 % Final    Eosinophils % 02/04/2022 2.2  1.0 - 4.0 % Final    Basophils % 02/04/2022 0.4  0.0 - 1.0 % Final    Immature Granulocytes % 02/04/2022 0.4  0.0 - 0.4 % Final    nRBC, Auto 02/04/2022 0.0  <=0.0 % Final    Neutrophils, Abs 02/04/2022 5.46  1.80 - 7.70 K/uL Final    Lymphocytes, Absolute 02/04/2022 2.58  1.00 - 4.80 K/uL Final    Monocytes, Absolute 02/04/2022 1.69 (A) 0.00 - 0.80 K/uL Final    Eosinophils, Absolute 02/04/2022 0.22  0.00 - 0.50 K/uL Final    Basophils, Absolute 02/04/2022 0.04  0.00 - 0.20 K/uL Final    Immature Granulocytes, Absolute 02/04/2022 0.04  0.00 - 0.04 K/uL Final    nRBC, Absolute 02/04/2022 0.00  <=0.00 x10e3/uL Final    Diff Type 02/04/2022 Auto   Final   Office Visit on 12/22/2021   Component Date Value Ref Range Status    POC Amphetamines 12/22/2021 Negative  Negative, Inconclusive Final    POC Barbiturates 12/22/2021 Negative  Negative, Inconclusive Final    POC Benzodiazepines 12/22/2021 Negative  Negative, Inconclusive Final    POC  Cocaine 12/22/2021 Negative  Negative, Inconclusive Final    POC THC 12/22/2021 Negative  Negative, Inconclusive Final    POC Methadone 12/22/2021 Negative  Negative, Inconclusive Final    POC Methamphetamine 12/22/2021 Negative  Negative, Inconclusive Final    POC Opiates 12/22/2021 Negative  Negative, Inconclusive Final    POC Oxycodone 12/22/2021 Negative  Negative, Inconclusive Final    POC Phencyclidine 12/22/2021 Negative  Negative, Inconclusive Final    POC Methylenedioxymethamphetamine * 12/22/2021 Negative  Negative, Inconclusive Final    POC Tricyclic Antidepressants 12/22/2021 Negative  Negative, Inconclusive Final    POC Buprenorphine 12/22/2021 Negative   Final     Acceptable 12/22/2021 Yes   Final    POC Temperature (Urine) 12/22/2021 94   Final    pH, UA 12/22/2021 6.0  5.0, 5.5, 6.0, 6.5, 7.0, 7.5, 8.0 pH Units Final    Specific Gravity, UA 12/22/2021 1.025  <=1.005, 1.010, 1.015, 1.020, 1.025, 1.030 Final    Creatinine, Urine 12/22/2021 230 (A) 28 - 219 mg/dL Final    6-Acetylmorphine 12/22/2021 Negative  10 ng/mL Final    7-Aminoclonazepam 12/22/2021 Negative  Negative 25 ng/mL Final    a-Hydroxyalprazolam 12/22/2021 Negative  25 ng/mL Final    Acetyl Fentanyl 12/22/2021 Negative  2.5 ng/mL Final    Acetyl Norfentanyl Oxalate 12/22/2021 Negative  5 ng/mL Final    Benzoylecgonine 12/22/2021 Negative  100 ng/mL Final    Buprenorphine 12/22/2021 Negative  25 ng/mL Final    Codeine 12/22/2021 Negative  25 ng/mL Final    EDDP 12/22/2021 Negative  25 ng/mL Final    Fentanyl 12/22/2021 Negative  2.5 ng/mL Final    Hydrocodone 12/22/2021 Negative  25 ng/mL Final    Hydromorphone 12/22/2021 Negative  25 ng/mL Final    Lorazepam 12/22/2021 Negative  25 ng/mL Final    Morphine 12/22/2021 Negative  25 ng/mL Final    Norbuprenorphine 12/22/2021 Negative  25 ng/mL Final    Nordiazepam 12/22/2021 Negative  25 ng/mL Final    Norfentanyl Oxalate 12/22/2021 Negative  5  ng/mL Final    Norhydrocodone 12/22/2021 Negative  50 ng/mL Final    Noroxycodone HCL 12/22/2021 258.3 (A) <50.0 50 ng/mL Final    Oxazepam 12/22/2021 Negative  25 ng/mL Final    Oxycodone 12/22/2021 29.8 (A) <25.0 25 ng/mL Final    Oxymorphone 12/22/2021 Negative  25 ng/mL Final    Tapentadol 12/22/2021 Negative  25 ng/mL Final    Temazepam 12/22/2021 Negative  25 ng/mL Final    THC-COOH 12/22/2021 Negative  25 ng/mL Final    Tramadol 12/22/2021 Negative  100 ng/mL Final    Amphetamine, Urine 12/22/2021 Negative  Negative 100 ng/mL Final    Methamphetamines, Urine 12/22/2021 Negative  Negative 100 ng/mL Final    Methadone, Urine 12/22/2021 Negative  Negative 25 ng/mL Final   Office Visit on 12/15/2021   Component Date Value Ref Range Status    Sodium 12/15/2021 140  136 - 145 mmol/L Final    Potassium 12/15/2021 4.0  3.5 - 5.1 mmol/L Final    Chloride 12/15/2021 108 (A) 98 - 107 mmol/L Final    CO2 12/15/2021 27  21 - 32 mmol/L Final    Anion Gap 12/15/2021 9  7 - 16 mmol/L Final    Glucose 12/15/2021 88  74 - 106 mg/dL Final    BUN 12/15/2021 14  7 - 18 mg/dL Final    Creatinine 12/15/2021 1.03 (A) 0.55 - 1.02 mg/dL Final    BUN/Creatinine Ratio 12/15/2021 14  6 - 20 Final    Calcium 12/15/2021 9.3  8.5 - 10.1 mg/dL Final    Total Protein 12/15/2021 7.4  6.4 - 8.2 g/dL Final    Albumin 12/15/2021 3.3 (A) 3.5 - 5.0 g/dL Final    Globulin 12/15/2021 4.1 (A) 2.0 - 4.0 g/dL Final    A/G Ratio 12/15/2021 0.8   Final    Bilirubin, Total 12/15/2021 0.6  0.0 - 1.2 mg/dL Final    Alk Phos 12/15/2021 91  37 - 98 U/L Final    ALT 12/15/2021 46  13 - 56 U/L Final    AST 12/15/2021 22  15 - 37 U/L Final    eGFR  12/15/2021 76  >=60 mL/min/1.73m² Final    WBC 12/15/2021 11.42 (A) 4.50 - 11.00 K/uL Final    RBC 12/15/2021 4.94  4.20 - 5.40 M/uL Final    Hemoglobin 12/15/2021 13.7  12.0 - 16.0 g/dL Final    Hematocrit 12/15/2021 42.7  38.0 - 47.0 % Final    MCV 12/15/2021 86.4   80.0 - 96.0 fL Final    MCH 12/15/2021 27.7  27.0 - 31.0 pg Final    MCHC 12/15/2021 32.1  32.0 - 36.0 g/dL Final    RDW 12/15/2021 15.2 (A) 11.5 - 14.5 % Final    Platelet Count 12/15/2021 360  150 - 400 K/uL Final    MPV 12/15/2021 10.2  9.4 - 12.4 fL Final    Neutrophils % 12/15/2021 70.7 (A) 53.0 - 65.0 % Final    Lymphocytes % 12/15/2021 16.1 (A) 27.0 - 41.0 % Final    Monocytes % 12/15/2021 10.9 (A) 2.0 - 6.0 % Final    Eosinophils % 12/15/2021 1.3  1.0 - 4.0 % Final    Basophils % 12/15/2021 0.3  0.0 - 1.0 % Final    Immature Granulocytes % 12/15/2021 0.7 (A) 0.0 - 0.4 % Final    nRBC, Auto 12/15/2021 0.0  <=0.0 % Final    Neutrophils, Abs 12/15/2021 8.08 (A) 1.80 - 7.70 K/uL Final    Lymphocytes, Absolute 12/15/2021 1.84  1.00 - 4.80 K/uL Final    Monocytes, Absolute 12/15/2021 1.24 (A) 0.00 - 0.80 K/uL Final    Eosinophils, Absolute 12/15/2021 0.15  0.00 - 0.50 K/uL Final    Basophils, Absolute 12/15/2021 0.03  0.00 - 0.20 K/uL Final    Immature Granulocytes, Absolute 12/15/2021 0.08 (A) 0.00 - 0.04 K/uL Final    nRBC, Absolute 12/15/2021 0.00  <=0.00 x10e3/uL Final    Diff Type 12/15/2021 Auto   Final   Admission on 11/23/2021, Discharged on 11/23/2021   Component Date Value Ref Range Status    Sodium 11/23/2021 145  136 - 145 mmol/L Final    Potassium 11/23/2021 3.6  3.5 - 5.1 mmol/L Final    Chloride 11/23/2021 106  98 - 107 mmol/L Final    CO2 11/23/2021 26  21 - 32 mmol/L Final    Anion Gap 11/23/2021 17 (A) 7 - 16 mmol/L Final    Glucose 11/23/2021 96  74 - 106 mg/dL Final    BUN 11/23/2021 14  7 - 18 mg/dL Final    Creatinine 11/23/2021 1.12 (A) 0.55 - 1.02 mg/dL Final    BUN/Creatinine Ratio 11/23/2021 13  6 - 20 Final    Calcium 11/23/2021 9.3  8.5 - 10.1 mg/dL Final    Total Protein 11/23/2021 7.7  6.4 - 8.2 g/dL Final    Albumin 11/23/2021 3.5  3.5 - 5.0 g/dL Final    Globulin 11/23/2021 4.2 (A) 2.0 - 4.0 g/dL Final    A/G Ratio 11/23/2021 0.8   Final     Bilirubin, Total 11/23/2021 0.3  0.0 - 1.2 mg/dL Final    Alk Phos 11/23/2021 90  37 - 98 U/L Final    ALT 11/23/2021 30  13 - 56 U/L Final    AST 11/23/2021 19  15 - 37 U/L Final    eGFR  11/23/2021 69  >=60 mL/min/1.73m² Final    Troponin I High Sensitivity 11/23/2021 6.9  <=60.4 pg/mL Final    Magnesium 11/23/2021 1.9  1.7 - 2.3 mg/dL Final    WBC 11/23/2021 9.42  4.50 - 11.00 K/uL Final    RBC 11/23/2021 5.27  4.20 - 5.40 M/uL Final    Hemoglobin 11/23/2021 14.1  12.0 - 16.0 g/dL Final    Hematocrit 11/23/2021 43.9  38.0 - 47.0 % Final    MCV 11/23/2021 83.3  80.0 - 96.0 fL Final    MCH 11/23/2021 26.8 (A) 27.0 - 31.0 pg Final    MCHC 11/23/2021 32.1  32.0 - 36.0 g/dL Final    RDW 11/23/2021 14.7 (A) 11.5 - 14.5 % Final    Platelet Count 11/23/2021 512 (A) 150 - 400 K/uL Final    MPV 11/23/2021 9.9  9.4 - 12.4 fL Final    Neutrophils % 11/23/2021 44.2 (A) 53.0 - 65.0 % Final    Lymphocytes % 11/23/2021 34.8  27.0 - 41.0 % Final    Neutrophils, Abs 11/23/2021 4.16  1.80 - 7.70 K/uL Final    Lymphocytes, Absolute 11/23/2021 3.28  1.00 - 4.80 K/uL Final    Diff Type 11/23/2021 Auto   Final    Monocytes % 11/23/2021 15.9 (A) 2.0 - 6.0 % Final    Eosinophils % 11/23/2021 4.4 (A) 1.0 - 4.0 % Final    Basophils % 11/23/2021 0.2  0.0 - 1.0 % Final    Immature Granulocytes % 11/23/2021 0.5 (A) 0.0 - 0.4 % Final    Monocytes, Absolute 11/23/2021 1.50 (A) 0.00 - 0.80 K/uL Final    Eosinophils, Absolute 11/23/2021 0.41  0.00 - 0.50 K/uL Final    Immature Granulocytes, Absolute 11/23/2021 0.05 (A) 0.00 - 0.04 K/uL Final    Basophils, Absolute 11/23/2021 0.02  0.00 - 0.20 K/uL Final         Assessment:      1. Lumbosacral radiculopathy    2. Encounter for long-term (current) use of other medications    3. Idiopathic chronic pancreatitis    4. Polymyositis with myopathy    5. Dorsalgia, unspecified    6. Restless leg syndrome            A's of Opioid Risk  Assessment  Activity:Patient can perform ADL.   Analgesia:Patients pain is partially controlled by current medication. Patient has tried OTC medications such as Tylenol and Ibuprofen with out relief.   Adverse Effects: Patient denies constipation or sedation.  Aberrant Behavior:  reviewed with no aberrant drug seeking/taking behavior.  Overdose reversal drug naloxone discussed    Drug screen reviewed      Requested Prescriptions     Signed Prescriptions Disp Refills    oxyCODONE-acetaminophen (PERCOCET) 7.5-325 mg per tablet 90 tablet 0     Sig: Take 1 tablet by mouth every 8 (eight) hours.    oxyCODONE-acetaminophen (PERCOCET) 7.5-325 mg per tablet 90 tablet 0     Sig: Take 1 tablet by mouth every 8 (eight) hours.    diazePAM (VALIUM) 10 MG Tab 2 tablet 0     Sig: Take 1 tablet 1 hour prior to MRI, may repeat x1    rOPINIRole (REQUIP) 0.25 MG tablet 90 tablet 1     Sig: Take 1 tablet (0.25 mg total) by mouth every 8 (eight) hours.         Plan:    Pharmacy closed on weekends    Following rheumatology, Fitz, IV therapy, polymyositis    Due to her autoimmune suppressant medication she has acquired tuberculosis currently being treated    Chronic pancreatitis, polymyositis    Complaint of back pain buttock and leg pain left greater than right left leg sharp stabbing components radiating into the left lower extremity worse with standing walking laughing or coughing ongoing for more than 3 months, she states she can have some increasing numbness and tingling increasing sharp stabbing components worse with increased activity    She denies loss of bowel or bladder function    Requesting further evaluation for left lower extremity radicular-type symptoms    Continue current medication    X-ray lumbar spine flexion-extension views, x-ray pelvis and hips    MRI lumbar spine no contrast lumbosacral radiculopathy  MRI for consideration procedure/surgery    I have given the patient medically directed home exercise  program    Follow-up 2 months    Dr. Matthew, February 2022    Bring original prescription medication bottles/container/box with labels to each visit

## 2022-05-24 PROBLEM — J06.9 UPPER RESPIRATORY TRACT INFECTION: Status: ACTIVE | Noted: 2022-01-01

## 2022-05-24 NOTE — PROGRESS NOTES
Subjective:       Patient ID: Alanna Briggs is a 41 y.o. female.    Chief Complaint: Shortness of Breath (Hurting between shoulder blades), Chest Pain (Heavyness in chest.), and Sinus Problem      Pt. Still on TB treatment. Now with night sweats and cough.    Review of Systems   Constitutional: Positive for fever. Negative for fatigue.   HENT: Negative for nasal congestion and dental problem.    Eyes: Negative for discharge.   Respiratory: Positive for cough and shortness of breath.    Cardiovascular: Negative for chest pain.   Gastrointestinal: Negative for constipation, diarrhea, nausea and vomiting.   Genitourinary: Negative for bladder incontinence, difficulty urinating and hot flashes.   Musculoskeletal: Positive for arthralgias and myalgias.   Allergic/Immunologic: Negative for environmental allergies.   Neurological: Negative for headaches.   Psychiatric/Behavioral: Negative for behavioral problems and confusion.         Objective:      Physical Exam  Vitals and nursing note reviewed.   Constitutional:       Appearance: Normal appearance. She is normal weight.   HENT:      Head: Normocephalic and atraumatic.      Right Ear: Tympanic membrane normal.      Left Ear: Tympanic membrane normal.      Nose: Congestion present.      Mouth/Throat:      Mouth: Mucous membranes are moist.   Eyes:      Extraocular Movements: Extraocular movements intact.      Conjunctiva/sclera: Conjunctivae normal.      Pupils: Pupils are equal, round, and reactive to light.   Cardiovascular:      Rate and Rhythm: Normal rate and regular rhythm.      Pulses: Normal pulses.   Pulmonary:      Effort: Pulmonary effort is normal.      Breath sounds: Normal breath sounds.      Comments: CXR demonstrates progressive pulmonary fibrosis  Abdominal:      General: Abdomen is flat. Bowel sounds are normal.      Palpations: Abdomen is soft.   Musculoskeletal:         General: Normal range of motion.      Cervical back: Normal range of motion and  neck supple.      Comments: Multiple site arthritis   Skin:     General: Skin is warm and dry.   Neurological:      General: No focal deficit present.      Mental Status: She is alert and oriented to person, place, and time.   Psychiatric:         Mood and Affect: Mood normal.         Assessment:       Cough  -     X-Ray Chest PA And Lateral; Future; Expected date: 05/24/2022    Upper respiratory tract infection, unspecified type  -     cefTRIAXone injection 1 g  -     betamethasone acetate-betamethasone sodium phosphate injection 6 mg  -     ketorolac injection 60 mg    Polymyositis    TB (pulmonary tuberculosis)

## 2022-06-06 NOTE — DISCHARGE INSTRUCTIONS
For the stomach pain:  Take acid blocking medication (prilosec, Pepcid, etc.) OTC.  TAD.  F/u with PCP in 2 days if not better, sooner if worse.   F/u with pulmonology appointment, EVEN IF you feel better.   Return to ED if you think any emergency may be getting started.

## 2022-06-06 NOTE — ED TRIAGE NOTES
C/o sob x 2 weeks -seen by pcp and referred to pulmonolgist-has appt 6/15/22-c/o back and abd pain

## 2022-06-06 NOTE — ED PROVIDER NOTES
Encounter Date: 2022       History     Chief Complaint   Patient presents with    Shortness of Breath     C/o sob x 2 weeks with h/o pulmonary fibrosis and TB     History of shortness of breath x2 weeks.  She has seen her PCP, had basic workup, and has been referred to pulmonologist, initial appointment pending, .  She c/o pain in the JS region that radiates through to the back, and says she has a h/o pancreatitis and this may be due to that.  She reports breathing comfortably as long as she is resting, but that she gets short of breath whenever she does any kind of activity.  She has not had relief from this throughout her work up time.          Review of patient's allergies indicates:   Allergen Reactions    Opioids - morphine analogues Itching     Past Medical History:   Diagnosis Date    Anemia     Anxiety     Chronic gastritis     Chronic pain syndrome     Hypertension     Hypokalemia     Migraine     Other chronic pancreatitis     Polymyositis     Polymyositis with myopathy     Pulmonary fibrosis     Vitamin D deficiency      Past Surgical History:   Procedure Laterality Date     SECTION      CHOLECYSTECTOMY      COLONOSCOPY      ESOPHAGOGASTRODUODENOSCOPY      ESOPHAGOGASTRODUODENOSCOPY      LUNG BIOPSY       Family History   Problem Relation Age of Onset    Hypertension Mother     Heart disease Father     Cancer Maternal Grandmother     Stroke Maternal Grandmother     Arthritis Paternal Grandmother     Stroke Paternal Grandmother     Cancer Paternal Grandfather      Social History     Tobacco Use    Smoking status: Never Smoker    Smokeless tobacco: Never Used   Substance Use Topics    Alcohol use: Not Currently    Drug use: Yes     Types: Oxycodone     Comment: Prescribed by Dr Hood for chronic pain at [facility]     Review of Systems   Respiratory: Positive for shortness of breath. Negative for cough, choking, chest tightness, wheezing and stridor.     Cardiovascular: Negative for chest pain and leg swelling.   Gastrointestinal: Positive for abdominal pain and nausea. Negative for abdominal distention, anal bleeding, blood in stool, constipation, diarrhea, rectal pain and vomiting.   All other systems reviewed and are negative.      Physical Exam     Initial Vitals [06/06/22 1024]   BP Pulse Resp Temp SpO2   138/75 (!) 121 (!) 28 98.7 °F (37.1 °C) (!) 86 %      MAP       --         Physical Exam    Nursing note and vitals reviewed.  Constitutional: She appears well-developed and well-nourished.   HENT:   Head: Normocephalic and atraumatic.   Eyes: EOM are normal. Pupils are equal, round, and reactive to light.   Neck: Neck supple. No tracheal deviation present. No JVD present.   Cardiovascular: Regular rhythm, normal heart sounds and intact distal pulses. Exam reveals no gallop and no friction rub.    No murmur heard.  Tachycardia, rate in the 110s to 120s noted during exam.   Pulmonary/Chest: Breath sounds normal. No stridor. No respiratory distress. She has no wheezes. She has no rhonchi. She has no rales. She exhibits no tenderness.   Abdominal: Abdomen is soft. Bowel sounds are normal. She exhibits no distension. There is abdominal tenderness (mild TTP in JS region, with no rebound, guarding or peritoneal signs.). There is no rebound and no guarding.   Musculoskeletal:         General: No tenderness or edema. Normal range of motion.      Cervical back: Neck supple.     Neurological: She is alert and oriented to person, place, and time.   Skin: Skin is warm and dry. Capillary refill takes less than 2 seconds.   Psychiatric: She has a normal mood and affect.         Medical Screening Exam   See Full Note    ED Course   Procedures  Labs Reviewed - No data to display       Imaging Results    None          Medications - No data to display                    Clinical Impression:   Final diagnoses:  [R06.02] SOB (shortness of breath) (Primary)  [R10.13] Abdominal  pain, epigastric                 Luis E Mccabe MD  06/06/22 [id number]

## 2022-06-22 NOTE — PROGRESS NOTES
Subjective:      Patient ID: Stefan Briggs is a 42 y.o. female.    Chief Complaint: Abdominal Pain and generalized body pain      Pain  This is a chronic problem. The current episode started more than 1 year ago. The problem occurs daily. The problem has been unchanged. Associated symptoms include fatigue and neck pain. Pertinent negatives include no change in bowel habit, coughing, rash, sore throat or vertigo.     Review of Systems   Constitutional: Positive for fatigue. Negative for activity change, appetite change and unexpected weight change.   HENT: Negative for drooling, ear discharge, ear pain, facial swelling, nosebleeds, sore throat, trouble swallowing, voice change and goiter.    Eyes: Negative for photophobia, pain, discharge, redness and visual disturbance.   Respiratory: Negative for apnea, cough, choking, chest tightness, shortness of breath, wheezing and stridor.    Cardiovascular: Negative for palpitations and leg swelling.   Gastrointestinal: Negative for abdominal distention, change in bowel habit, rectal pain, fecal incontinence and change in bowel habit.   Endocrine: Negative for cold intolerance, heat intolerance, polydipsia, polyphagia and polyuria.   Genitourinary: Negative for flank pain and hot flashes.   Musculoskeletal: Positive for back pain and neck pain.   Integumentary:  Negative for color change, pallor and rash.   Allergic/Immunologic: Negative for immunocompromised state.   Neurological: Negative for dizziness, vertigo, seizures, syncope, facial asymmetry, speech difficulty, light-headedness, disturbances in coordination, memory loss and coordination difficulties.   Hematological: Negative for adenopathy. Does not bruise/bleed easily.   Psychiatric/Behavioral: Negative for agitation, behavioral problems, confusion, decreased concentration, dysphoric mood, hallucinations, self-injury and suicidal ideas. The patient is not nervous/anxious and is not hyperactive.            "  Objective:  Vitals:    06/23/22 1020 06/23/22 1021   BP: 134/88    Pulse: 104    Weight: 87.5 kg (193 lb)    Height: 5' 2" (1.575 m)    PainSc:   6   6         Physical Exam  Vitals and nursing note reviewed. Exam conducted with a chaperone present.   Constitutional:       General: She is awake. She is not in acute distress.     Appearance: Normal appearance. She is not diaphoretic.   HENT:      Head: Normocephalic and atraumatic.      Nose: Nose normal.      Mouth/Throat:      Mouth: Mucous membranes are moist.      Pharynx: Oropharynx is clear.   Eyes:      Conjunctiva/sclera: Conjunctivae normal.      Pupils: Pupils are equal, round, and reactive to light.   Cardiovascular:      Rate and Rhythm: Normal rate.   Pulmonary:      Effort: Pulmonary effort is normal. No respiratory distress.   Abdominal:      Palpations: Abdomen is soft.      Tenderness: There is no guarding.   Musculoskeletal:         General: Normal range of motion.      Cervical back: Normal range of motion and neck supple. Tenderness present. No rigidity.      Lumbar back: Tenderness present.   Skin:     General: Skin is warm and dry.      Coloration: Skin is not jaundiced or pale.   Neurological:      General: No focal deficit present.      Mental Status: She is alert and oriented to person, place, and time. Mental status is at baseline.      Cranial Nerves: No cranial nerve deficit (II-XII).   Psychiatric:         Mood and Affect: Mood normal.         Behavior: Behavior normal. Behavior is cooperative.         Thought Content: Thought content normal.           No orders of the defined types were placed in this encounter.       X-Ray Chest PA And Lateral  Narrative: EXAMINATION:  XR CHEST PA AND LATERAL    CLINICAL HISTORY:  .  Cough, unspecified    COMPARISON:  June 6, 2022 chest x-ray    TECHNIQUE:  PA and lateral views of the chest    FINDINGS:  The cardiac silhouette is not enlarged.  There is some mild fullness in the right paratracheal " area which could be related to of some underlying lymphadenopathy.  This appearance is unchanged in retrospect.  There is no pulmonary vascular engorgement.    There is patchy and hazy pneumonia in the left perihilar region which has developed in the interval.  Lungs are otherwise unchanged.    There is no layering pleural effusion.    Osseous structures are similar  Impression: Evidence of left perihilar pneumonia    Potential low right paratracheal mediastinal lymphadenopathy.  This could be confirmed/clarified with nonemergent CT chest    Electronically signed by: Michael Hazel  Date:    06/28/2022  Time:    12:46       Admission on 06/06/2022, Discharged on 06/06/2022   Component Date Value Ref Range Status    Sodium 06/06/2022 141  136 - 145 mmol/L Final    Potassium 06/06/2022 4.6  3.5 - 5.1 mmol/L Final    Chloride 06/06/2022 104  98 - 107 mmol/L Final    CO2 06/06/2022 29  21 - 32 mmol/L Final    Anion Gap 06/06/2022 13  7 - 16 mmol/L Final    Glucose 06/06/2022 109 (A) 74 - 106 mg/dL Final    BUN 06/06/2022 8  7 - 18 mg/dL Final    Creatinine 06/06/2022 0.93  0.55 - 1.02 mg/dL Final    BUN/Creatinine Ratio 06/06/2022 9  6 - 20 Final    Calcium 06/06/2022 9.6  8.5 - 10.1 mg/dL Final    Total Protein 06/06/2022 7.8  6.4 - 8.2 g/dL Final    Albumin 06/06/2022 3.4 (A) 3.5 - 5.0 g/dL Final    Globulin 06/06/2022 4.4 (A) 2.0 - 4.0 g/dL Final    A/G Ratio 06/06/2022 0.8   Final    Bilirubin, Total 06/06/2022 0.2  0.0 - 1.2 mg/dL Final    Alk Phos 06/06/2022 91  37 - 98 U/L Final    ALT 06/06/2022 47  13 - 56 U/L Final    AST 06/06/2022 41 (A) 15 - 37 U/L Final    eGFR 06/06/2022 71  >=60 mL/min/1.73m² Final    Influenza A 06/06/2022 Negative  Negative, Invalid Final    Influenza B 06/06/2022 Negative  Negative, Invalid Final    COVID-19 Ag 06/06/2022 Negative  Negative, Invalid Final    D-Dimer 06/06/2022 0.45  0.00 - 0.47 µg/mL Final    Lipase 06/06/2022 137  73 - 393 U/L Final    WBC  06/06/2022 7.12  4.50 - 11.00 K/uL Final    RBC 06/06/2022 5.05  4.20 - 5.40 M/uL Final    Hemoglobin 06/06/2022 13.8  12.0 - 16.0 g/dL Final    Hematocrit 06/06/2022 42.9  38.0 - 47.0 % Final    MCV 06/06/2022 85.0  80.0 - 96.0 fL Final    MCH 06/06/2022 27.3  27.0 - 31.0 pg Final    MCHC 06/06/2022 32.2  32.0 - 36.0 g/dL Final    RDW 06/06/2022 14.4  11.5 - 14.5 % Final    Platelet Count 06/06/2022 377  150 - 400 K/uL Final    MPV 06/06/2022 9.1 (A) 9.4 - 12.4 fL Final    Neutrophils % 06/06/2022 59.5  53.0 - 65.0 % Final    Lymphocytes % 06/06/2022 25.8 (A) 27.0 - 41.0 % Final    Neutrophils, Abs 06/06/2022 4.23  1.80 - 7.70 K/uL Final    Lymphocytes, Absolute 06/06/2022 1.84  1.00 - 4.80 K/uL Final    Diff Type 06/06/2022 Auto   Final    Monocytes % 06/06/2022 11.8 (A) 2.0 - 6.0 % Final    Eosinophils % 06/06/2022 2.5  1.0 - 4.0 % Final    Basophils % 06/06/2022 0.1  0.0 - 1.0 % Final    Immature Granulocytes % 06/06/2022 0.3  0.0 - 0.4 % Final    Monocytes, Absolute 06/06/2022 0.84 (A) 0.00 - 0.80 K/uL Final    Eosinophils, Absolute 06/06/2022 0.18  0.00 - 0.50 K/uL Final    Immature Granulocytes, Absolute 06/06/2022 0.02  0.00 - 0.04 K/uL Final    Basophils, Absolute 06/06/2022 0.01  0.00 - 0.20 K/uL Final    ProBNP 06/06/2022 123  1 - 125 pg/mL Final   Appointment on 06/02/2022   Component Date Value Ref Range Status    SARS Coronavirus 2 Antigen 06/02/2022 Negative  Negative Final     Acceptable 06/02/2022 Yes   Final   Office Visit on 04/20/2022   Component Date Value Ref Range Status    POC Amphetamines 04/20/2022 Negative  Negative, Inconclusive Final    POC Barbiturates 04/20/2022 Negative  Negative, Inconclusive Final    POC Benzodiazepines 04/20/2022 Negative  Negative, Inconclusive Final    POC Cocaine 04/20/2022 Negative  Negative, Inconclusive Final    POC THC 04/20/2022 Negative  Negative, Inconclusive Final    POC Methadone 04/20/2022 Negative   Negative, Inconclusive Final    POC Methamphetamine 04/20/2022 Negative  Negative, Inconclusive Final    POC Opiates 04/20/2022 Negative  Negative, Inconclusive Final    POC Oxycodone 04/20/2022 Presumptive Positive (A) Negative, Inconclusive Final    POC Phencyclidine 04/20/2022 Negative  Negative, Inconclusive Final    POC Methylenedioxymethamphetamine * 04/20/2022 Negative  Negative, Inconclusive Final    POC Tricyclic Antidepressants 04/20/2022 Negative  Negative, Inconclusive Final    POC Buprenorphine 04/20/2022 Negative   Final     Acceptable 04/20/2022 Yes   Final    POC Temperature (Urine) 04/20/2022 90   Final    pH, UA 04/20/2022 6.0  5.0, 5.5, 6.0, 6.5, 7.0, 7.5, 8.0 pH Units Final    Specific Gravity, UA 04/20/2022 1.025  <=1.005, 1.010, 1.015, 1.020, 1.025, 1.030 Final    Creatinine, Urine 04/20/2022 208  28 - 219 mg/dL Final    6-Acetylmorphine 04/20/2022 Negative  10 ng/mL Final    7-Aminoclonazepam 04/20/2022 Negative  Negative 25 ng/mL Final    a-Hydroxyalprazolam 04/20/2022 Negative  25 ng/mL Final    Acetyl Fentanyl 04/20/2022 Negative  2.5 ng/mL Final    Acetyl Norfentanyl Oxalate 04/20/2022 Negative  5 ng/mL Final    Benzoylecgonine 04/20/2022 Negative  100 ng/mL Final    Buprenorphine 04/20/2022 Negative  25 ng/mL Final    Codeine 04/20/2022 Negative  25 ng/mL Final    EDDP 04/20/2022 Negative  25 ng/mL Final    Fentanyl 04/20/2022 Negative  2.5 ng/mL Final    Hydrocodone 04/20/2022 Negative  25 ng/mL Final    Hydromorphone 04/20/2022 Negative  25 ng/mL Final    Lorazepam 04/20/2022 Negative  25 ng/mL Final    Morphine 04/20/2022 Negative  25 ng/mL Final    Norbuprenorphine 04/20/2022 Negative  25 ng/mL Final    Nordiazepam 04/20/2022 Negative  25 ng/mL Final    Norfentanyl Oxalate 04/20/2022 Negative  5 ng/mL Final    Norhydrocodone 04/20/2022 Negative  50 ng/mL Final    Noroxycodone HCL 04/20/2022 >500.0 (A) <50.0 50 ng/mL Final    Oxazepam  04/20/2022 Negative  25 ng/mL Final    Oxycodone 04/20/2022 >250.0 (A) <25.0 25 ng/mL Final    Oxymorphone 04/20/2022 27.5 (A) <25.0 25 ng/mL Final    Tapentadol 04/20/2022 Negative  25 ng/mL Final    Temazepam 04/20/2022 Negative  25 ng/mL Final    THC-COOH 04/20/2022 Negative  25 ng/mL Final    Tramadol 04/20/2022 Negative  100 ng/mL Final    Amphetamine, Urine 04/20/2022 Negative  Negative 100 ng/mL Final    Methamphetamines, Urine 04/20/2022 Negative  Negative 100 ng/mL Final    Methadone, Urine 04/20/2022 Negative  Negative 25 ng/mL Final   Office Visit on 04/07/2022   Component Date Value Ref Range Status    Color, UA 04/07/2022 Yellow   Final    Spec Grav UA 04/07/2022 1.015   Final    pH, UA 04/07/2022 6.0   Final    WBC, UA 04/07/2022 large   Final    Nitrite, UA 04/07/2022 negative   Final    Protein, POC 04/07/2022 negative   Final    Glucose, UA 04/07/2022 negative   Final    Ketones, UA 04/07/2022 negative   Final    Bilirubin, POC 04/07/2022 negative   Final    Urobilinogen, UA 04/07/2022 0.2   Final    Blood, UA 04/07/2022 large   Final    Culture, Urine 04/07/2022 15,000 Enterococcus faecalis (A)  Final   Office Visit on 02/04/2022   Component Date Value Ref Range Status    Sodium 02/04/2022 145  136 - 145 mmol/L Final    Potassium 02/04/2022 3.8  3.5 - 5.1 mmol/L Final    Chloride 02/04/2022 111 (A) 98 - 107 mmol/L Final    CO2 02/04/2022 28  21 - 32 mmol/L Final    Anion Gap 02/04/2022 10  7 - 16 mmol/L Final    Glucose 02/04/2022 91  74 - 106 mg/dL Final    BUN 02/04/2022 12  7 - 18 mg/dL Final    Creatinine 02/04/2022 0.99  0.55 - 1.02 mg/dL Final    BUN/Creatinine Ratio 02/04/2022 12  6 - 20 Final    Calcium 02/04/2022 9.3  8.5 - 10.1 mg/dL Final    Total Protein 02/04/2022 7.3  6.4 - 8.2 g/dL Final    Albumin 02/04/2022 3.5  3.5 - 5.0 g/dL Final    Globulin 02/04/2022 3.8  2.0 - 4.0 g/dL Final    A/G Ratio 02/04/2022 0.9   Final    Bilirubin, Total 02/04/2022  0.4  0.0 - 1.2 mg/dL Final    Alk Phos 02/04/2022 86  37 - 98 U/L Final    ALT 02/04/2022 41  13 - 56 U/L Final    AST 02/04/2022 24  15 - 37 U/L Final    eGFR 02/04/2022 66  >=60 mL/min/1.73m² Final    WBC 02/04/2022 10.03  4.50 - 11.00 K/uL Final    RBC 02/04/2022 5.01  4.20 - 5.40 M/uL Final    Hemoglobin 02/04/2022 13.9  12.0 - 16.0 g/dL Final    Hematocrit 02/04/2022 43.6  38.0 - 47.0 % Final    MCV 02/04/2022 87.0  80.0 - 96.0 fL Final    MCH 02/04/2022 27.7  27.0 - 31.0 pg Final    MCHC 02/04/2022 31.9 (A) 32.0 - 36.0 g/dL Final    RDW 02/04/2022 15.9 (A) 11.5 - 14.5 % Final    Platelet Count 02/04/2022 433 (A) 150 - 400 K/uL Final    MPV 02/04/2022 10.2  9.4 - 12.4 fL Final    Neutrophils % 02/04/2022 54.5  53.0 - 65.0 % Final    Lymphocytes % 02/04/2022 25.7 (A) 27.0 - 41.0 % Final    Monocytes % 02/04/2022 16.8 (A) 2.0 - 6.0 % Final    Eosinophils % 02/04/2022 2.2  1.0 - 4.0 % Final    Basophils % 02/04/2022 0.4  0.0 - 1.0 % Final    Immature Granulocytes % 02/04/2022 0.4  0.0 - 0.4 % Final    nRBC, Auto 02/04/2022 0.0  <=0.0 % Final    Neutrophils, Abs 02/04/2022 5.46  1.80 - 7.70 K/uL Final    Lymphocytes, Absolute 02/04/2022 2.58  1.00 - 4.80 K/uL Final    Monocytes, Absolute 02/04/2022 1.69 (A) 0.00 - 0.80 K/uL Final    Eosinophils, Absolute 02/04/2022 0.22  0.00 - 0.50 K/uL Final    Basophils, Absolute 02/04/2022 0.04  0.00 - 0.20 K/uL Final    Immature Granulocytes, Absolute 02/04/2022 0.04  0.00 - 0.04 K/uL Final    nRBC, Absolute 02/04/2022 0.00  <=0.00 x10e3/uL Final    Diff Type 02/04/2022 Auto   Final         Assessment:      1. Polymyositis with myopathy    2. Restless leg syndrome    3. Idiopathic chronic pancreatitis            A's of Opioid Risk Assessment  Activity:Patient can perform ADL.   Analgesia:Patients pain is partially controlled by current medication. Patient has tried OTC medications such as Tylenol and Ibuprofen with out relief.   Adverse Effects:  Patient denies constipation or sedation.  Aberrant Behavior:  reviewed with no aberrant drug seeking/taking behavior.  Overdose reversal drug naloxone discussed    Drug screen reviewed      Requested Prescriptions     Signed Prescriptions Disp Refills    rOPINIRole (REQUIP) 0.25 MG tablet 90 tablet 1     Sig: Take 1 tablet (0.25 mg total) by mouth every 8 (eight) hours.     Patient not taking: Reported on 6/28/2022    oxyCODONE-acetaminophen (PERCOCET) 7.5-325 mg per tablet 90 tablet 0     Sig: Take 1 tablet by mouth every 8 (eight) hours.    oxyCODONE-acetaminophen (PERCOCET) 7.5-325 mg per tablet 90 tablet 0     Sig: Take 1 tablet by mouth every 8 (eight) hours.         Plan:    Pharmacy closed on weekends    Following rheumatology, Fitz, IV therapy, polymyositis    Due to her autoimmune suppressant medication she has acquired tuberculosis she has completed treatment    Unfortunately she has developed pulmonary fibrosis  Now on continuous nasal cannula O2 due to his shortness of breath    Chronic pancreatitis, polymyositis    Complaint of back pain buttock and leg pain numbness and tingling left greater than right radicular in nature considering lumbar procedures    Follow-up after MRI lumbar spine x-rays lumbar spine pelvis and hips    X-ray lumbar spine not completed     x-ray pelvis and hips [facility] April 20, 2022 no fracture noted mild degenerative changes    MRI lumbar spine [facility] April 20, 2022  Multiple level degenerative changes small bulging disc L4/5 right greater than left    She would like to continue conservative management    Continue home exercise program as directed    Continue current medication    She will discuss pulmonary status with her pulmonologist to see if she can be cleared for lumbar procedures she will bring clearance letter with her to clinic    Follow-up 2 months    Dr. Matthew, February 2022    Bring original prescription medication bottles/container/box with  labels to each visit

## 2022-06-28 NOTE — PROGRESS NOTES
Subjective:       Patient ID: Alanna Briggs is a 41 y.o. female.    Chief Complaint: Chest Pain, Back Pain, Cough, and tuberculosis    Chest Pain   Associated symptoms include back pain and a cough. Pertinent negatives include no fever, headaches, nausea, shortness of breath or vomiting.   Back Pain  Associated symptoms include chest pain. Pertinent negatives include no bladder incontinence, fever or headaches.   Cough  Associated symptoms include chest pain. Pertinent negatives include no fever, headaches or shortness of breath. There is no history of environmental allergies.     Review of Systems   Constitutional: Negative for fatigue and fever.   HENT: Negative for nasal congestion and dental problem.    Eyes: Negative for discharge.   Respiratory: Positive for cough. Negative for shortness of breath.    Cardiovascular: Positive for chest pain.   Gastrointestinal: Negative for constipation, diarrhea, nausea and vomiting.   Genitourinary: Negative for bladder incontinence, difficulty urinating and hot flashes.   Musculoskeletal: Positive for back pain.   Allergic/Immunologic: Negative for environmental allergies.   Neurological: Negative for headaches.   Psychiatric/Behavioral: Negative for behavioral problems and confusion.         Objective:      Physical Exam  Constitutional:       Appearance: Normal appearance. She is normal weight.   HENT:      Head: Normocephalic and atraumatic.      Right Ear: Tympanic membrane normal.      Left Ear: Tympanic membrane normal.      Nose: Nose normal.      Mouth/Throat:      Mouth: Mucous membranes are moist.   Eyes:      Extraocular Movements: Extraocular movements intact.      Conjunctiva/sclera: Conjunctivae normal.      Pupils: Pupils are equal, round, and reactive to light.   Cardiovascular:      Rate and Rhythm: Normal rate and regular rhythm.      Pulses: Normal pulses.   Pulmonary:      Effort: Pulmonary effort is normal.      Breath sounds: Normal breath sounds.       Comments: Wearing oxygen. Lungs are clear to auscultation  Abdominal:      General: Abdomen is flat. Bowel sounds are normal.      Palpations: Abdomen is soft.   Musculoskeletal:         General: Normal range of motion.      Cervical back: Normal range of motion and neck supple.   Skin:     General: Skin is warm and dry.   Neurological:      General: No focal deficit present.      Mental Status: She is alert and oriented to person, place, and time.   Psychiatric:         Mood and Affect: Mood normal.         Assessment:       Problem List Items Addressed This Visit        Pulmonary    Cough - Primary    Relevant Orders    X-Ray Chest PA And Lateral    Pulmonary fibrosis    Relevant Medications    betamethasone acetate-betamethasone sodium phosphate injection 6 mg (Start on 6/28/2022 10:45 AM)    albuterol (ACCUNEB) 1.25 mg/3 mL Nebu       ID    TB (pulmonary tuberculosis)       Orthopedic    Arthritis          Plan:

## 2022-08-02 NOTE — PROGRESS NOTES
Subjective:       Patient ID: Stefan Briggs is a 42 y.o. female.    Chief Complaint: Migraine and Nausea (Didn't bring medication today.)    Pt's pulmonary fibrosis is progressing. Now her pulmonologist wants to try another immunosuppressive drug. Got rituxin yesterday. Now off TB meds.    Migraine   Associated symptoms include nausea. Pertinent negatives include no coughing, fever or vomiting.   Nausea  Associated symptoms include nausea. Pertinent negatives include no chest pain, congestion, coughing, fatigue, fever, headaches or vomiting.     Review of Systems   Constitutional: Negative for fatigue and fever.   HENT: Negative for nasal congestion and dental problem.    Eyes: Negative for discharge.   Respiratory: Negative for cough and shortness of breath.    Cardiovascular: Negative for chest pain.   Gastrointestinal: Positive for nausea. Negative for constipation, diarrhea and vomiting.   Genitourinary: Negative for bladder incontinence, difficulty urinating and hot flashes.   Allergic/Immunologic: Negative for environmental allergies.   Neurological: Negative for headaches.   Psychiatric/Behavioral: Negative for behavioral problems and confusion.         Objective:      Physical Exam  Vitals and nursing note reviewed.   Constitutional:       Appearance: Normal appearance. She is normal weight.   HENT:      Head: Normocephalic and atraumatic.      Right Ear: Tympanic membrane normal.      Left Ear: Tympanic membrane normal.      Nose: Nose normal.      Mouth/Throat:      Mouth: Mucous membranes are moist.   Eyes:      Extraocular Movements: Extraocular movements intact.      Conjunctiva/sclera: Conjunctivae normal.      Pupils: Pupils are equal, round, and reactive to light.   Cardiovascular:      Rate and Rhythm: Normal rate and regular rhythm.      Pulses: Normal pulses.   Pulmonary:      Effort: Pulmonary effort is normal.      Breath sounds: Normal breath sounds.      Comments: On continuous  O2.  Abdominal:      General: Abdomen is flat. Bowel sounds are normal.      Palpations: Abdomen is soft.   Musculoskeletal:         General: Normal range of motion.      Cervical back: Normal range of motion and neck supple.   Skin:     General: Skin is warm and dry.   Neurological:      General: No focal deficit present.      Mental Status: She is alert and oriented to person, place, and time.   Psychiatric:         Mood and Affect: Mood normal.         Assessment:       Problem List Items Addressed This Visit        Neuro    Migraine without status migrainosus, not intractable - Primary    Relevant Medications    ketorolac injection 60 mg (Start on 8/2/2022 10:00 AM)    promethazine injection 25 mg (Start on 8/2/2022 10:00 AM)       Pulmonary    Pulmonary fibrosis       Immunology/Multi System    Polymyositis          Plan:       RTC as needed

## 2022-08-09 PROBLEM — G43.109 MIGRAINE WITH AURA AND WITHOUT STATUS MIGRAINOSUS, NOT INTRACTABLE: Status: ACTIVE | Noted: 2021-06-02

## 2022-08-09 NOTE — PROGRESS NOTES
Subjective:       Patient ID: Stefan Briggs is a 42 y.o. female.    Chief Complaint: Migraine, Nausea, and Emesis    Had rituxan yesterday.    Migraine   Associated symptoms include nausea and vomiting. Pertinent negatives include no coughing or fever.   Nausea  Associated symptoms include arthralgias, headaches, myalgias, nausea and vomiting. Pertinent negatives include no chest pain, congestion, coughing, fatigue or fever.   Emesis   Associated symptoms include arthralgias, headaches and myalgias. Pertinent negatives include no chest pain, coughing, diarrhea or fever.     Review of Systems   Constitutional: Negative for fatigue and fever.   HENT: Negative for nasal congestion and dental problem.    Eyes: Negative for discharge.   Respiratory: Negative for cough and shortness of breath.    Cardiovascular: Negative for chest pain.   Gastrointestinal: Positive for nausea and vomiting. Negative for constipation and diarrhea.   Genitourinary: Negative for bladder incontinence, difficulty urinating and hot flashes.   Musculoskeletal: Positive for arthralgias and myalgias.   Allergic/Immunologic: Negative for environmental allergies.   Neurological: Positive for headaches.   Psychiatric/Behavioral: Negative for behavioral problems and confusion.         Objective:      Physical Exam  Vitals and nursing note reviewed.   Constitutional:       Appearance: Normal appearance. She is normal weight.   HENT:      Head: Normocephalic and atraumatic.      Right Ear: Tympanic membrane normal.      Left Ear: Tympanic membrane normal.      Nose: Nose normal.      Mouth/Throat:      Mouth: Mucous membranes are moist.   Eyes:      Extraocular Movements: Extraocular movements intact.      Conjunctiva/sclera: Conjunctivae normal.      Pupils: Pupils are equal, round, and reactive to light.   Cardiovascular:      Rate and Rhythm: Normal rate and regular rhythm.      Pulses: Normal pulses.   Pulmonary:      Effort: Pulmonary effort is  normal.      Breath sounds: Normal breath sounds.      Comments: Pulmonary fibrosis - on continuous O2  Abdominal:      General: Abdomen is flat. Bowel sounds are normal.      Palpations: Abdomen is soft.   Musculoskeletal:         General: Normal range of motion.      Cervical back: Normal range of motion and neck supple.      Comments: polymyositis   Skin:     General: Skin is warm and dry.   Neurological:      General: No focal deficit present.      Mental Status: She is alert and oriented to person, place, and time.   Psychiatric:         Mood and Affect: Mood normal.         Assessment:       Problem List Items Addressed This Visit        Neuro    Migraine with aura and without status migrainosus, not intractable - Primary    Relevant Medications    ketorolac injection 60 mg (Start on 8/9/2022 10:30 AM)    promethazine injection 25 mg (Start on 8/9/2022 10:30 AM)    LORazepam injection 2 mg (Start on 8/9/2022 10:30 AM)       Pulmonary    Pulmonary fibrosis       Immunology/Multi System    Polymyositis          Plan:       RTC as needed

## 2022-08-16 NOTE — PROGRESS NOTES
Subjective:       Patient ID: Stefan Briggs is a 42 y.o. female.    Chief Complaint: Headache, Nausea, and Emesis (Patient had a treatment yesterday)    With a headache. Had a rituxan treatment yesterday.    Headache   Associated symptoms include nausea and vomiting. Pertinent negatives include no coughing or fever.   Nausea  Associated symptoms include arthralgias, headaches, nausea and vomiting. Pertinent negatives include no chest pain, congestion, coughing, fatigue or fever.   Emesis   Associated symptoms include arthralgias and headaches. Pertinent negatives include no chest pain, coughing, diarrhea or fever.     Review of Systems   Constitutional: Negative for fatigue and fever.   HENT: Negative for nasal congestion and dental problem.    Eyes: Negative for discharge.   Respiratory: Negative for cough and shortness of breath.    Cardiovascular: Negative for chest pain.   Gastrointestinal: Positive for nausea and vomiting. Negative for constipation and diarrhea.   Genitourinary: Negative for bladder incontinence, difficulty urinating and hot flashes.   Musculoskeletal: Positive for arthralgias.   Allergic/Immunologic: Negative for environmental allergies.   Neurological: Positive for headaches.   Psychiatric/Behavioral: Negative for behavioral problems and confusion.         Objective:      Physical Exam  Vitals and nursing note reviewed.   Constitutional:       Appearance: Normal appearance. She is normal weight.   HENT:      Head: Normocephalic and atraumatic.      Right Ear: Tympanic membrane normal.      Left Ear: Tympanic membrane normal.      Nose: Nose normal.      Mouth/Throat:      Mouth: Mucous membranes are moist.   Eyes:      Extraocular Movements: Extraocular movements intact.      Conjunctiva/sclera: Conjunctivae normal.      Pupils: Pupils are equal, round, and reactive to light.   Cardiovascular:      Rate and Rhythm: Normal rate and regular rhythm.      Pulses: Normal pulses.   Pulmonary:       Effort: Pulmonary effort is normal.      Breath sounds: Normal breath sounds.   Abdominal:      General: Abdomen is flat. Bowel sounds are normal.      Palpations: Abdomen is soft.   Musculoskeletal:         General: Normal range of motion.      Cervical back: Normal range of motion and neck supple.      Comments: Multiple site arthritis; polymyositis   Skin:     General: Skin is warm and dry.   Neurological:      General: No focal deficit present.      Mental Status: She is alert and oriented to person, place, and time.   Psychiatric:         Mood and Affect: Mood normal.         Assessment:       Problem List Items Addressed This Visit        Neuro    Drug-induced headache, not elsewhere classified, not intractable - Primary    Relevant Medications    promethazine injection 25 mg (Start on 8/16/2022  9:30 AM)    ketorolac injection 60 mg (Start on 8/16/2022  9:30 AM)    LORazepam injection 2 mg (Start on 8/16/2022  9:30 AM)       Immunology/Multi System    Polymyositis       Orthopedic    Arthritis          Plan:       RTC as needed

## 2022-08-22 NOTE — PROGRESS NOTES
Subjective:      Patient ID: Alanna Briggs is a 42 y.o. female.    Chief Complaint: Abdominal Pain, Leg Pain, and Joint Pain      Pain  This is a chronic problem. The current episode started more than 1 year ago. The problem occurs daily. The problem has been waxing and waning. Associated symptoms include abdominal pain, fatigue and neck pain. Pertinent negatives include no change in bowel habit, coughing, rash, sore throat or vertigo.     Review of Systems   Constitutional: Positive for fatigue. Negative for activity change, appetite change and unexpected weight change.   HENT: Negative for drooling, ear discharge, ear pain, facial swelling, nosebleeds, sore throat, trouble swallowing, voice change and goiter.    Eyes: Negative for photophobia, pain, discharge, redness and visual disturbance.   Respiratory: Negative for apnea, cough, choking, chest tightness, shortness of breath, wheezing and stridor.    Cardiovascular: Negative for palpitations and leg swelling.   Gastrointestinal: Positive for abdominal pain. Negative for abdominal distention, change in bowel habit, rectal pain, fecal incontinence and change in bowel habit.   Endocrine: Negative for cold intolerance, heat intolerance, polydipsia, polyphagia and polyuria.   Genitourinary: Negative for flank pain and hot flashes.   Musculoskeletal: Positive for back pain and neck pain.   Integumentary:  Negative for color change, pallor and rash.   Allergic/Immunologic: Negative for immunocompromised state.   Neurological: Negative for dizziness, vertigo, seizures, syncope, facial asymmetry, speech difficulty, light-headedness, disturbances in coordination, memory loss and coordination difficulties.   Hematological: Negative for adenopathy. Does not bruise/bleed easily.   Psychiatric/Behavioral: Negative for agitation, behavioral problems, confusion, decreased concentration, dysphoric mood, hallucinations, self-injury and suicidal ideas. The patient is not  "nervous/anxious and is not hyperactive.             Past Surgical History:   Procedure Laterality Date     SECTION      CHOLECYSTECTOMY      COLONOSCOPY      ESOPHAGOGASTRODUODENOSCOPY      ESOPHAGOGASTRODUODENOSCOPY      LUNG BIOPSY         Objective:  Vitals:    22 1052 22 1053   BP: 129/82    Pulse: 85    Weight: 87.2 kg (192 lb 3.2 oz)    Height: 5' 2.4" (1.585 m)    PainSc:   7   7         Physical Exam  Vitals and nursing note reviewed. Exam conducted with a chaperone present.   Constitutional:       General: She is awake. She is not in acute distress.     Appearance: Normal appearance. She is not diaphoretic.   HENT:      Head: Normocephalic and atraumatic.      Nose: Nose normal.      Mouth/Throat:      Mouth: Mucous membranes are moist.      Pharynx: Oropharynx is clear.   Eyes:      Conjunctiva/sclera: Conjunctivae normal.      Pupils: Pupils are equal, round, and reactive to light.   Cardiovascular:      Rate and Rhythm: Normal rate.   Pulmonary:      Effort: Pulmonary effort is normal. No respiratory distress.   Abdominal:      Palpations: Abdomen is soft.      Tenderness: There is no guarding.   Musculoskeletal:         General: Normal range of motion.      Cervical back: Normal range of motion and neck supple. Tenderness present. No rigidity.      Lumbar back: Tenderness present.   Skin:     General: Skin is warm and dry.      Coloration: Skin is not jaundiced or pale.   Neurological:      General: No focal deficit present.      Mental Status: She is alert and oriented to person, place, and time. Mental status is at baseline.      Cranial Nerves: No cranial nerve deficit (II-XII).   Psychiatric:         Mood and Affect: Mood normal.         Behavior: Behavior normal. Behavior is cooperative.         Thought Content: Thought content normal.           Orders Placed This Encounter   Procedures    POCT Urine Drug Screen Presump     Interpretive Information:     Negative:  No drug " detected at the cut off level.   Positive:  This result represents presumptive positive for the   tested drug, other substances may yield a positive response other   than the analyte of interest. This result should be utilized for   diagnostic purpose only. Confirmation testing will be performed upon physician request only.           X-Ray Chest PA And Lateral  Narrative: EXAMINATION:  XR CHEST PA AND LATERAL    CLINICAL HISTORY:  .  Cough, unspecified    COMPARISON:  June 6, 2022 chest x-ray    TECHNIQUE:  PA and lateral views of the chest    FINDINGS:  The cardiac silhouette is not enlarged.  There is some mild fullness in the right paratracheal area which could be related to of some underlying lymphadenopathy.  This appearance is unchanged in retrospect.  There is no pulmonary vascular engorgement.    There is patchy and hazy pneumonia in the left perihilar region which has developed in the interval.  Lungs are otherwise unchanged.    There is no layering pleural effusion.    Osseous structures are similar  Impression: Evidence of left perihilar pneumonia    Potential low right paratracheal mediastinal lymphadenopathy.  This could be confirmed/clarified with nonemergent CT chest    Electronically signed by: Michael Hazel  Date:    06/28/2022  Time:    12:46       Lab Requisition on 07/14/2022   Component Date Value Ref Range Status    Sodium 07/14/2022 142  136 - 145 mmol/L Final    Potassium 07/14/2022 4.5  3.5 - 5.1 mmol/L Final    Chloride 07/14/2022 107  98 - 107 mmol/L Final    CO2 07/14/2022 28  21 - 32 mmol/L Final    Anion Gap 07/14/2022 12  7 - 16 mmol/L Final    Glucose 07/14/2022 92  74 - 106 mg/dL Final    BUN 07/14/2022 12  7 - 18 mg/dL Final    Creatinine 07/14/2022 1.23 (A) 0.55 - 1.02 mg/dL Final    BUN/Creatinine Ratio 07/14/2022 10  6 - 20 Final    Calcium 07/14/2022 9.9  8.5 - 10.1 mg/dL Final    eGFR 07/14/2022 51 (A) >=60 mL/min/1.73m² Final    TSH 07/14/2022 1.380  0.358 -  3.740 uIU/mL Final    Total Protein 07/14/2022 7.5  6.4 - 8.2 g/dL Final    Albumin 07/14/2022 3.4 (A) 3.5 - 5.0 g/dL Final    Bilirubin, Total 07/14/2022 0.3  0.0 - 1.2 mg/dL Final    Bilirubin, Direct 07/14/2022 0.1  0.0 - 0.2 mg/dL Final    AST 07/14/2022 19  15 - 37 U/L Final    ALT 07/14/2022 21  13 - 56 U/L Final    Alk Phos 07/14/2022 86  37 - 98 U/L Final    WBC 07/14/2022 9.35  4.50 - 11.00 K/uL Final    RBC 07/14/2022 5.05  4.20 - 5.40 M/uL Final    Hemoglobin 07/14/2022 13.5  12.0 - 16.0 g/dL Final    Hematocrit 07/14/2022 42.5  38.0 - 47.0 % Final    MCV 07/14/2022 84.2  80.0 - 96.0 fL Final    MCH 07/14/2022 26.7 (A) 27.0 - 31.0 pg Final    MCHC 07/14/2022 31.8 (A) 32.0 - 36.0 g/dL Final    RDW 07/14/2022 13.2  11.5 - 14.5 % Final    Platelet Count 07/14/2022 540 (A) 150 - 400 K/uL Final    MPV 07/14/2022 9.7  9.4 - 12.4 fL Final    Neutrophils % 07/14/2022 60.1  53.0 - 65.0 % Final    Lymphocytes % 07/14/2022 23.6 (A) 27.0 - 41.0 % Final    Monocytes % 07/14/2022 12.6 (A) 2.0 - 6.0 % Final    Eosinophils % 07/14/2022 2.8  1.0 - 4.0 % Final    Basophils % 07/14/2022 0.4  0.0 - 1.0 % Final    Immature Granulocytes % 07/14/2022 0.5 (A) 0.0 - 0.4 % Final    nRBC, Auto 07/14/2022 0.0  <=0.0 % Final    Neutrophils, Abs 07/14/2022 5.61  1.80 - 7.70 K/uL Final    Lymphocytes, Absolute 07/14/2022 2.21  1.00 - 4.80 K/uL Final    Monocytes, Absolute 07/14/2022 1.18 (A) 0.00 - 0.80 K/uL Final    Eosinophils, Absolute 07/14/2022 0.26  0.00 - 0.50 K/uL Final    Basophils, Absolute 07/14/2022 0.04  0.00 - 0.20 K/uL Final    Immature Granulocytes, Absolute 07/14/2022 0.05 (A) 0.00 - 0.04 K/uL Final    nRBC, Absolute 07/14/2022 0.00  <=0.00 x10e3/uL Final    Diff Type 07/14/2022 Auto   Final   Admission on 06/06/2022, Discharged on 06/06/2022   Component Date Value Ref Range Status    Sodium 06/06/2022 141  136 - 145 mmol/L Final    Potassium 06/06/2022 4.6  3.5 - 5.1 mmol/L Final     Chloride 06/06/2022 104  98 - 107 mmol/L Final    CO2 06/06/2022 29  21 - 32 mmol/L Final    Anion Gap 06/06/2022 13  7 - 16 mmol/L Final    Glucose 06/06/2022 109 (A) 74 - 106 mg/dL Final    BUN 06/06/2022 8  7 - 18 mg/dL Final    Creatinine 06/06/2022 0.93  0.55 - 1.02 mg/dL Final    BUN/Creatinine Ratio 06/06/2022 9  6 - 20 Final    Calcium 06/06/2022 9.6  8.5 - 10.1 mg/dL Final    Total Protein 06/06/2022 7.8  6.4 - 8.2 g/dL Final    Albumin 06/06/2022 3.4 (A) 3.5 - 5.0 g/dL Final    Globulin 06/06/2022 4.4 (A) 2.0 - 4.0 g/dL Final    A/G Ratio 06/06/2022 0.8   Final    Bilirubin, Total 06/06/2022 0.2  0.0 - 1.2 mg/dL Final    Alk Phos 06/06/2022 91  37 - 98 U/L Final    ALT 06/06/2022 47  13 - 56 U/L Final    AST 06/06/2022 41 (A) 15 - 37 U/L Final    eGFR 06/06/2022 71  >=60 mL/min/1.73m² Final    Influenza A 06/06/2022 Negative  Negative, Invalid Final    Influenza B 06/06/2022 Negative  Negative, Invalid Final    COVID-19 Ag 06/06/2022 Negative  Negative, Invalid Final    D-Dimer 06/06/2022 0.45  0.00 - 0.47 µg/mL Final    Lipase 06/06/2022 137  73 - 393 U/L Final    WBC 06/06/2022 7.12  4.50 - 11.00 K/uL Final    RBC 06/06/2022 5.05  4.20 - 5.40 M/uL Final    Hemoglobin 06/06/2022 13.8  12.0 - 16.0 g/dL Final    Hematocrit 06/06/2022 42.9  38.0 - 47.0 % Final    MCV 06/06/2022 85.0  80.0 - 96.0 fL Final    MCH 06/06/2022 27.3  27.0 - 31.0 pg Final    MCHC 06/06/2022 32.2  32.0 - 36.0 g/dL Final    RDW 06/06/2022 14.4  11.5 - 14.5 % Final    Platelet Count 06/06/2022 377  150 - 400 K/uL Final    MPV 06/06/2022 9.1 (A) 9.4 - 12.4 fL Final    Neutrophils % 06/06/2022 59.5  53.0 - 65.0 % Final    Lymphocytes % 06/06/2022 25.8 (A) 27.0 - 41.0 % Final    Neutrophils, Abs 06/06/2022 4.23  1.80 - 7.70 K/uL Final    Lymphocytes, Absolute 06/06/2022 1.84  1.00 - 4.80 K/uL Final    Diff Type 06/06/2022 Auto   Final    Monocytes % 06/06/2022 11.8 (A) 2.0 - 6.0 % Final     Eosinophils % 06/06/2022 2.5  1.0 - 4.0 % Final    Basophils % 06/06/2022 0.1  0.0 - 1.0 % Final    Immature Granulocytes % 06/06/2022 0.3  0.0 - 0.4 % Final    Monocytes, Absolute 06/06/2022 0.84 (A) 0.00 - 0.80 K/uL Final    Eosinophils, Absolute 06/06/2022 0.18  0.00 - 0.50 K/uL Final    Immature Granulocytes, Absolute 06/06/2022 0.02  0.00 - 0.04 K/uL Final    Basophils, Absolute 06/06/2022 0.01  0.00 - 0.20 K/uL Final    ProBNP 06/06/2022 123  1 - 125 pg/mL Final   Appointment on 06/02/2022   Component Date Value Ref Range Status    SARS Coronavirus 2 Antigen 06/02/2022 Negative  Negative Final     Acceptable 06/02/2022 Yes   Final   Office Visit on 04/20/2022   Component Date Value Ref Range Status    POC Amphetamines 04/20/2022 Negative  Negative, Inconclusive Final    POC Barbiturates 04/20/2022 Negative  Negative, Inconclusive Final    POC Benzodiazepines 04/20/2022 Negative  Negative, Inconclusive Final    POC Cocaine 04/20/2022 Negative  Negative, Inconclusive Final    POC THC 04/20/2022 Negative  Negative, Inconclusive Final    POC Methadone 04/20/2022 Negative  Negative, Inconclusive Final    POC Methamphetamine 04/20/2022 Negative  Negative, Inconclusive Final    POC Opiates 04/20/2022 Negative  Negative, Inconclusive Final    POC Oxycodone 04/20/2022 Presumptive Positive (A) Negative, Inconclusive Final    POC Phencyclidine 04/20/2022 Negative  Negative, Inconclusive Final    POC Methylenedioxymethamphetamine * 04/20/2022 Negative  Negative, Inconclusive Final    POC Tricyclic Antidepressants 04/20/2022 Negative  Negative, Inconclusive Final    POC Buprenorphine 04/20/2022 Negative   Final     Acceptable 04/20/2022 Yes   Final    POC Temperature (Urine) 04/20/2022 90   Final    pH, UA 04/20/2022 6.0  5.0, 5.5, 6.0, 6.5, 7.0, 7.5, 8.0 pH Units Final    Specific Gravity, UA 04/20/2022 1.025  <=1.005, 1.010, 1.015, 1.020, 1.025, 1.030 Final     Creatinine, Urine 04/20/2022 208  28 - 219 mg/dL Final    6-Acetylmorphine 04/20/2022 Negative  10 ng/mL Final    7-Aminoclonazepam 04/20/2022 Negative  Negative 25 ng/mL Final    a-Hydroxyalprazolam 04/20/2022 Negative  25 ng/mL Final    Acetyl Fentanyl 04/20/2022 Negative  2.5 ng/mL Final    Acetyl Norfentanyl Oxalate 04/20/2022 Negative  5 ng/mL Final    Benzoylecgonine 04/20/2022 Negative  100 ng/mL Final    Buprenorphine 04/20/2022 Negative  25 ng/mL Final    Codeine 04/20/2022 Negative  25 ng/mL Final    EDDP 04/20/2022 Negative  25 ng/mL Final    Fentanyl 04/20/2022 Negative  2.5 ng/mL Final    Hydrocodone 04/20/2022 Negative  25 ng/mL Final    Hydromorphone 04/20/2022 Negative  25 ng/mL Final    Lorazepam 04/20/2022 Negative  25 ng/mL Final    Morphine 04/20/2022 Negative  25 ng/mL Final    Norbuprenorphine 04/20/2022 Negative  25 ng/mL Final    Nordiazepam 04/20/2022 Negative  25 ng/mL Final    Norfentanyl Oxalate 04/20/2022 Negative  5 ng/mL Final    Norhydrocodone 04/20/2022 Negative  50 ng/mL Final    Noroxycodone HCL 04/20/2022 >500.0 (A) <50.0 50 ng/mL Final    Oxazepam 04/20/2022 Negative  25 ng/mL Final    Oxycodone 04/20/2022 >250.0 (A) <25.0 25 ng/mL Final    Oxymorphone 04/20/2022 27.5 (A) <25.0 25 ng/mL Final    Tapentadol 04/20/2022 Negative  25 ng/mL Final    Temazepam 04/20/2022 Negative  25 ng/mL Final    THC-COOH 04/20/2022 Negative  25 ng/mL Final    Tramadol 04/20/2022 Negative  100 ng/mL Final    Amphetamine, Urine 04/20/2022 Negative  Negative 100 ng/mL Final    Methamphetamines, Urine 04/20/2022 Negative  Negative 100 ng/mL Final    Methadone, Urine 04/20/2022 Negative  Negative 25 ng/mL Final   Office Visit on 04/07/2022   Component Date Value Ref Range Status    Color, UA 04/07/2022 Yellow   Final    Spec Grav UA 04/07/2022 1.015   Final    pH, UA 04/07/2022 6.0   Final    WBC, UA 04/07/2022 large   Final    Nitrite, UA 04/07/2022 negative   Final     Protein, POC 04/07/2022 negative   Final    Glucose, UA 04/07/2022 negative   Final    Ketones, UA 04/07/2022 negative   Final    Bilirubin, POC 04/07/2022 negative   Final    Urobilinogen, UA 04/07/2022 0.2   Final    Blood, UA 04/07/2022 large   Final    Culture, Urine 04/07/2022 15,000 Enterococcus faecalis (A)  Final         Assessment:      1. Polymyositis with myopathy    2. Idiopathic chronic pancreatitis    3. Encounter for long-term (current) use of other medications    4. Restless leg syndrome            A's of Opioid Risk Assessment  Activity:Patient can perform ADL.   Analgesia:Patients pain is partially controlled by current medication. Patient has tried OTC medications such as Tylenol and Ibuprofen with out relief.   Adverse Effects: Patient denies constipation or sedation.  Aberrant Behavior:  reviewed with no aberrant drug seeking/taking behavior.  Overdose reversal drug naloxone discussed    Drug screen reviewed      Requested Prescriptions     Signed Prescriptions Disp Refills    oxyCODONE-acetaminophen (PERCOCET) 7.5-325 mg per tablet 90 tablet 0     Sig: Take 1 tablet by mouth every 8 (eight) hours.    oxyCODONE-acetaminophen (PERCOCET) 7.5-325 mg per tablet 90 tablet 0     Sig: Take 1 tablet by mouth every 8 (eight) hours.    rOPINIRole (REQUIP) 0.25 MG tablet 90 tablet 1     Sig: Take 1 tablet (0.25 mg total) by mouth every 8 (eight) hours.         Plan:    Pharmacy closed on weekends    May pick her September prescription of September 23rd     Will use September 24th as refill date next visit    Following rheumatology, Fitz, IV therapy, polymyositis progressing    Due to her autoimmune suppressant medication she has acquired tuberculosis she has completed treatment    Pulmonary fibrosis continuous nasal cannula O2 due to shortness of breath    Chronic pancreatitis, polymyositis    [facility] April 20, 2022 no fracture noted mild degenerative  changes    MRI lumbar spine [facility] April 20, 2022  Multiple level degenerative changes small bulging disc L4/5 right greater than left    She would like to continue conservative management    Continue home exercise program as directed    Continue current medication    Follow-up 2 months    Dr. Matthew, February 2022    Pill count     Physical therapy    Bring original prescription medication bottles/container/box with labels to each visit

## 2022-08-31 NOTE — PROGRESS NOTES
[facility]  Primary Care       PATIENT NAME: Alanna Briggs   : 1980    AGE: 42 y.o. DATE: 2022    MRN: [id number]        Reason for Visit / Chief Complaint:  Headache (Migraine onset 8\30\22)     Subjective:     HPI: Patient complains of headache after having infusion today for pulmonary fibrosis.          Review of Systems: Review of Systems   Constitutional:  Negative for chills, fatigue and fever.   Respiratory:  Negative for cough, chest tightness and shortness of breath (patient wearing portable O2 via NC).    Cardiovascular:  Negative for chest pain.   Gastrointestinal:  Negative for abdominal pain, constipation, diarrhea, nausea and vomiting.   Genitourinary:  Negative for dysuria.   Musculoskeletal:  Negative for gait problem.   Skin:  Negative for rash.   Neurological:  Positive for headaches.        Review of patient's allergies indicates:   Allergen Reactions    Opioids - morphine analogues Itching        Med List:  Current Outpatient Medications on File Prior to Visit   Medication Sig Dispense Refill    [DISCONTINUED] fluconazole (DIFLUCAN) 150 MG Tab Take 1 tablet (150 mg total) by mouth once daily. 10 tablet 1    albuterol (ACCUNEB) 1.25 mg/3 mL Nebu Take 3 mLs (1.25 mg total) by nebulization every 6 (six) hours as needed. Rescue 120 each 1    amLODIPine (NORVASC) 10 MG tablet Take 10 mg by mouth once daily.      amLODIPine (NORVASC) 5 MG tablet Take 5 mg by mouth once daily.      dexchlorphen-phenylephrine-DM (POLYTUSSIN DM) 1-5-10 mg/5 mL Syrp Take 10 mLs by mouth every 6 (six) hours while awake. (Patient not taking: No sig reported) 240 mL 1    medroxyprogesterone acetate (MEDROXYPROGESTERONE IM) Inject into the muscle.      nebivoloL (BYSTOLIC) 10 MG Tab Take 10 mg by mouth once daily.      nebivoloL (BYSTOLIC) 20 mg Tab Take 1 tablet by mouth once daily.      OFEV 150 mg Cap Take by mouth.      oxyCODONE-acetaminophen (PERCOCET) 7.5-325 mg per tablet Take 1 tablet  by mouth every 8 (eight) hours. 90 tablet 0    [START ON 2022] oxyCODONE-acetaminophen (PERCOCET) 7.5-325 mg per tablet Take 1 tablet by mouth every 8 (eight) hours. 90 tablet 0    rifAMpin (RIFADIN) 300 MG capsule Take 600 mg by mouth once daily.      riTUXimab (RITUXAN) 10 mg/mL injection Inject into the vein.      rOPINIRole (REQUIP) 0.25 MG tablet Take 1 tablet (0.25 mg total) by mouth every 8 (eight) hours. 90 tablet 1    [DISCONTINUED] ondansetron (ZOFRAN-ODT) 4 MG TbDL Take 1 tablet (4 mg total) by mouth every 6 (six) hours as needed (nausea/vomiting). 12 tablet 0     No current facility-administered medications on file prior to visit.       Medical/Social/Family History:  Past Medical History:   Diagnosis Date    Anemia     Anxiety     Chronic gastritis     Chronic pain syndrome     Hypertension     Hypokalemia     Migraine     Other chronic pancreatitis     Polymyositis     Polymyositis with myopathy     Pulmonary fibrosis     Pulmonary fibrosis     Vitamin D deficiency       Social History     Tobacco Use   Smoking Status Never   Smokeless Tobacco Never      Social History     Substance and Sexual Activity   Alcohol Use Not Currently       Family History   Problem Relation Age of Onset    Hypertension Mother     Heart disease Father     Cancer Maternal Grandmother     Stroke Maternal Grandmother     Arthritis Paternal Grandmother     Stroke Paternal Grandmother     Cancer Paternal Grandfather       Past Surgical History:   Procedure Laterality Date     SECTION      CHOLECYSTECTOMY      COLONOSCOPY      ESOPHAGOGASTRODUODENOSCOPY      ESOPHAGOGASTRODUODENOSCOPY      LUNG BIOPSY          There is no immunization history on file for this patient.       Objective:      Vitals:    22 1207   BP: (!) 144/75   BP Location: Left arm   Patient Position: Sitting   BP Method: Large (Automatic)   Pulse: (!) 57   Resp: 18   Temp: 99.4 °F (37.4 °C)   TempSrc: Oral   SpO2: 99%   Weight: 86.2 kg (190 lb)  "  Height: 5' 2" (1.575 m)     Body mass index is 34.75 kg/m².     Physical Exam: Physical Exam  Vitals and nursing note reviewed.   Constitutional:       Appearance: Normal appearance.   HENT:      Head: Normocephalic.      Mouth/Throat:      Mouth: Mucous membranes are moist.   Eyes:      Extraocular Movements: Extraocular movements intact.      Conjunctiva/sclera: Conjunctivae normal.      Pupils: Pupils are equal, round, and reactive to light.   Cardiovascular:      Rate and Rhythm: Regular rhythm. Bradycardia present.      Heart sounds: Normal heart sounds.   Pulmonary:      Effort: Pulmonary effort is normal. No respiratory distress.      Breath sounds: Normal breath sounds. No wheezing or rhonchi.   Musculoskeletal:         General: Normal range of motion.      Cervical back: Normal range of motion.   Skin:     General: Skin is warm and dry.   Neurological:      Mental Status: She is alert and oriented to person, place, and time.      Gait: Gait normal.   Psychiatric:         Mood and Affect: Mood normal.         Behavior: Behavior normal.              Assessment:          ICD-10-CM ICD-9-CM   1. Drug-induced headache, not elsewhere classified, not intractable  G44.40 339.3     E980.5   2. Nausea and vomiting, intractability of vomiting not specified, unspecified vomiting type  R11.2 787.01   3. Yeast infection  B37.9 112.9        Plan:       Drug-induced headache, not elsewhere classified, not intractable  -     promethazine injection 25 mg  -     ketorolac injection 60 mg    Nausea and vomiting, intractability of vomiting not specified, unspecified vomiting type  -     ondansetron (ZOFRAN-ODT) 4 MG TbDL; Take 1 tablet (4 mg total) by mouth every 6 (six) hours as needed (nausea/vomiting).  Dispense: 12 tablet; Refill: 0  -     promethazine injection 25 mg    Yeast infection  -     fluconazole (DIFLUCAN) 150 MG Tab; Take 1 tablet (150 mg total) by mouth once daily.  Dispense: 10 tablet; Refill: 1        New & " refilled meds:  Requested Prescriptions     Signed Prescriptions Disp Refills    ondansetron (ZOFRAN-ODT) 4 MG TbDL 12 tablet 0     Sig: Take 1 tablet (4 mg total) by mouth every 6 (six) hours as needed (nausea/vomiting).    fluconazole (DIFLUCAN) 150 MG Tab 10 tablet 1     Sig: Take 1 tablet (150 mg total) by mouth once daily.       Follow up if symptoms worsen or fail to improve.     There are no Patient Instructions on file for this visit.       Signature: Myranda Luciano DNP, FNP-C

## 2022-09-13 NOTE — TELEPHONE ENCOUNTER
----- Message from Myranda Luciano DNP, JOHN-C sent at 9/13/2022 11:14 AM CDT -----  Please notify patient of results

## 2022-09-13 NOTE — PROGRESS NOTES
[facility]  Primary Care       PATIENT NAME: Alanna Briggs   : 1980    AGE: 42 y.o. DATE: 2022    MRN: [id number]        Reason for Visit / Chief Complaint:  Cough, Headache, Fatigue (4 DAYS), and Shortness of Breath (CHILLS, VOMITING.  PT STATES HEAVINESS IN HER CHEST AND BACK)     Subjective:     HPI: Patient has fever, cough, shortness of breath, body aches, chills, headache, nausea, vomiting, diarrhea. Currently using O2 @ 4L/min/NC.    Patient states symptoms started last Thursday. States she has chest pain/heaviness when taking a deep breath.          Review of Systems: Review of Systems   Constitutional:  Positive for fever. Negative for chills and fatigue.   Respiratory:  Positive for cough and shortness of breath. Negative for chest tightness.         Patient states she has chest heaviness/chest pain only when taking a deep breath.    Cardiovascular:  Negative for chest pain.   Gastrointestinal:  Positive for diarrhea, nausea and vomiting. Negative for abdominal pain and constipation.   Genitourinary:  Negative for dysuria.   Musculoskeletal:  Negative for gait problem.   Skin:  Negative for rash.   Neurological:  Positive for headaches.        Review of patient's allergies indicates:   Allergen Reactions    Opioids - morphine analogues Itching        Med List:  Current Outpatient Medications on File Prior to Visit   Medication Sig Dispense Refill    albuterol (ACCUNEB) 1.25 mg/3 mL Nebu Take 3 mLs (1.25 mg total) by nebulization every 6 (six) hours as needed. Rescue 120 each 1    amLODIPine (NORVASC) 10 MG tablet Take 10 mg by mouth once daily.      amLODIPine (NORVASC) 5 MG tablet Take 5 mg by mouth once daily.      dexchlorphen-phenylephrine-DM (POLYTUSSIN DM) 1-5-10 mg/5 mL Syrp Take 10 mLs by mouth every 6 (six) hours while awake. (Patient not taking: No sig reported) 240 mL 1    fluconazole (DIFLUCAN) 150 MG Tab Take 1 tablet (150 mg total) by mouth once daily. 10  tablet 1    medroxyprogesterone acetate (MEDROXYPROGESTERONE IM) Inject into the muscle.      nebivoloL (BYSTOLIC) 10 MG Tab Take 10 mg by mouth once daily.      nebivoloL (BYSTOLIC) 20 mg Tab Take 1 tablet by mouth once daily.      OFEV 150 mg Cap Take by mouth.      ondansetron (ZOFRAN-ODT) 4 MG TbDL Take 1 tablet (4 mg total) by mouth every 6 (six) hours as needed (nausea/vomiting). 12 tablet 0    oxyCODONE-acetaminophen (PERCOCET) 7.5-325 mg per tablet Take 1 tablet by mouth every 8 (eight) hours. 90 tablet 0    [START ON 2022] oxyCODONE-acetaminophen (PERCOCET) 7.5-325 mg per tablet Take 1 tablet by mouth every 8 (eight) hours. 90 tablet 0    rifAMpin (RIFADIN) 300 MG capsule Take 600 mg by mouth once daily.      riTUXimab (RITUXAN) 10 mg/mL injection Inject into the vein.      rOPINIRole (REQUIP) 0.25 MG tablet Take 1 tablet (0.25 mg total) by mouth every 8 (eight) hours. 90 tablet 1     No current facility-administered medications on file prior to visit.       Medical/Social/Family History:  Past Medical History:   Diagnosis Date    Anemia     Anxiety     Chronic gastritis     Chronic pain syndrome     Hypertension     Hypokalemia     Migraine     Other chronic pancreatitis     Polymyositis     Polymyositis with myopathy     Pulmonary fibrosis     Pulmonary fibrosis     Vitamin D deficiency       Social History     Tobacco Use   Smoking Status Never   Smokeless Tobacco Never      Social History     Substance and Sexual Activity   Alcohol Use Not Currently       Family History   Problem Relation Age of Onset    Hypertension Mother     Heart disease Father     Cancer Maternal Grandmother     Stroke Maternal Grandmother     Arthritis Paternal Grandmother     Stroke Paternal Grandmother     Cancer Paternal Grandfather       Past Surgical History:   Procedure Laterality Date     SECTION      CHOLECYSTECTOMY      COLONOSCOPY      ESOPHAGOGASTRODUODENOSCOPY      ESOPHAGOGASTRODUODENOSCOPY      LUNG  "BIOPSY          There is no immunization history on file for this patient.       Objective:      Vitals:    09/13/22 0913   BP: 136/86   BP Location: Left arm   Patient Position: Sitting   BP Method: Large (Automatic)   Pulse: 105   Resp: 20   Temp: (!) 100.4 °F (38 °C)   TempSrc: Oral   SpO2: 96%  Comment: Pt is using portable oxygen   Weight: 86.6 kg (191 lb)   Height: 5' 2" (1.575 m)     Body mass index is 34.93 kg/m².     Physical Exam: Physical Exam  Vitals and nursing note reviewed.   Constitutional:       General: She is not in acute distress.     Appearance: Normal appearance.   HENT:      Head: Normocephalic.      Right Ear: Tympanic membrane, ear canal and external ear normal.      Left Ear: Tympanic membrane, ear canal and external ear normal.      Mouth/Throat:      Mouth: Mucous membranes are moist.   Eyes:      Extraocular Movements: Extraocular movements intact.      Conjunctiva/sclera: Conjunctivae normal.      Pupils: Pupils are equal, round, and reactive to light.   Cardiovascular:      Rate and Rhythm: Regular rhythm. Tachycardia present.      Heart sounds: Normal heart sounds.   Pulmonary:      Effort: Pulmonary effort is normal. No respiratory distress.      Breath sounds: Normal breath sounds. No wheezing or rhonchi.   Musculoskeletal:         General: Normal range of motion.      Cervical back: Normal range of motion.   Skin:     General: Skin is warm and dry.   Neurological:      Mental Status: She is alert and oriented to person, place, and time.      Gait: Gait normal.   Psychiatric:         Mood and Affect: Mood normal.         Behavior: Behavior normal.        Component      Latest Ref Rng & Units 9/13/2022   SARS Coronavirus 2 Antigen      Negative Negative   Rapid Influenza A Ag      Negative Negative   Rapid Influenza B Ag      Negative Negative    Acceptable       Yes         Assessment:          ICD-10-CM ICD-9-CM   1. Bronchitis  J40 490   2. Shortness of breath  " R06.02 786.05   3. Body aches  R52 780.96   4. Fever, unspecified fever cause  R50.9 780.60   5. Chills  R68.83 780.64   6. Coughing  R05.9 786.2   7. Diffuse idiopathic pulmonary fibrosis  J84.10 515   8. Nonintractable headache, unspecified chronicity pattern, unspecified headache type  R51.9 784.0   9. Nausea and vomiting, intractability of vomiting not specified, unspecified vomiting type  R11.2 787.01   10. Diarrhea, unspecified type  R19.7 787.91        Plan:       Bronchitis  -     cefTRIAXone injection 1 g  -     doxycycline (VIBRAMYCIN) 100 MG Cap; Take 1 capsule (100 mg total) by mouth 2 (two) times daily. for 10 days  Dispense: 20 capsule; Refill: 0  -     guaiFENesin 1,200 mg Ta12; Take 1 tablet by mouth every 12 (twelve) hours as needed (cough and congestion).  Dispense: 60 tablet; Refill: 1    Shortness of breath  -     POCT SARS-COV2 (COVID) with Flu Antigen  -     X-Ray Chest PA And Lateral; Future; Expected date: 09/13/2022    Body aches  -     POCT SARS-COV2 (COVID) with Flu Antigen    Fever, unspecified fever cause  -     POCT SARS-COV2 (COVID) with Flu Antigen    Chills  -     POCT SARS-COV2 (COVID) with Flu Antigen    Coughing  -     POCT SARS-COV2 (COVID) with Flu Antigen  -     X-Ray Chest PA And Lateral; Future; Expected date: 09/13/2022  -     guaiFENesin 1,200 mg Ta12; Take 1 tablet by mouth every 12 (twelve) hours as needed (cough and congestion).  Dispense: 60 tablet; Refill: 1    Diffuse idiopathic pulmonary fibrosis    Nonintractable headache, unspecified chronicity pattern, unspecified headache type  -     ketorolac injection 60 mg    Nausea and vomiting, intractability of vomiting not specified, unspecified vomiting type  -     promethazine injection 25 mg    Diarrhea, unspecified type        New & refilled meds:  Requested Prescriptions     Signed Prescriptions Disp Refills    doxycycline (VIBRAMYCIN) 100 MG Cap 20 capsule 0     Sig: Take 1 capsule (100 mg total) by mouth 2 (two)  times daily. for 10 days    guaiFENesin 1,200 mg Ta12 60 tablet 1     Sig: Take 1 tablet by mouth every 12 (twelve) hours as needed (cough and congestion).       Follow up if symptoms worsen or fail to improve.     There are no Patient Instructions on file for this visit.       Signature: Myranda Luciano DNP, JOHN-C

## 2022-10-19 NOTE — PROGRESS NOTES
Subjective:         Patient ID: Alanna Briggs is a 42 y.o. female.    Chief Complaint: generalized pain, Abdominal Pain, and Back Pain      Pain  This is a chronic problem. The current episode started more than 1 year ago. The problem occurs daily. The problem has been unchanged. Associated symptoms include abdominal pain, arthralgias and neck pain. Pertinent negatives include no anorexia, change in bowel habit, chills, coughing, fatigue, fever, rash, sore throat or vertigo.   Review of Systems   Constitutional:  Negative for activity change, appetite change, chills, fatigue, fever and unexpected weight change.   HENT:  Negative for drooling, ear discharge, ear pain, facial swelling, nosebleeds, sore throat, trouble swallowing, voice change and goiter.    Eyes:  Negative for photophobia, pain, discharge, redness and visual disturbance.   Respiratory:  Negative for apnea, cough, choking, chest tightness, shortness of breath, wheezing and stridor.    Cardiovascular:  Negative for palpitations and leg swelling.   Gastrointestinal:  Positive for abdominal pain. Negative for abdominal distention, anorexia, change in bowel habit, rectal pain, fecal incontinence and change in bowel habit.   Endocrine: Negative for cold intolerance, heat intolerance, polydipsia, polyphagia and polyuria.   Genitourinary:  Negative for flank pain and hot flashes.   Musculoskeletal:  Positive for arthralgias, back pain and neck pain.   Integumentary:  Negative for color change, pallor and rash.   Allergic/Immunologic: Negative for immunocompromised state.   Neurological:  Negative for dizziness, vertigo, seizures, syncope, facial asymmetry, speech difficulty, light-headedness, coordination difficulties, memory loss and coordination difficulties.   Hematological:  Negative for adenopathy. Does not bruise/bleed easily.   Psychiatric/Behavioral:  Negative for agitation, behavioral problems, confusion, decreased concentration, dysphoric mood,  "hallucinations, self-injury and suicidal ideas. The patient is not nervous/anxious and is not hyperactive.          Past Medical History:   Diagnosis Date    Anemia     Anxiety     Chronic gastritis     Chronic pain syndrome     Hypertension     Hypokalemia     Migraine     Other chronic pancreatitis     Polymyositis     Polymyositis with myopathy     Pulmonary fibrosis     Pulmonary fibrosis     Vitamin D deficiency      Past Surgical History:   Procedure Laterality Date     SECTION      CHOLECYSTECTOMY      COLONOSCOPY      ESOPHAGOGASTRODUODENOSCOPY      ESOPHAGOGASTRODUODENOSCOPY      LUNG BIOPSY       Social History     Socioeconomic History    Marital status: Single   Tobacco Use    Smoking status: Never    Smokeless tobacco: Never   Substance and Sexual Activity    Alcohol use: Not Currently    Drug use: Yes     Types: Oxycodone     Comment: Prescribed by Dr Hood for chronic pain at [facility]    Sexual activity: Yes     Family History   Problem Relation Age of Onset    Hypertension Mother     Heart disease Father     Cancer Maternal Grandmother     Stroke Maternal Grandmother     Arthritis Paternal Grandmother     Stroke Paternal Grandmother     Cancer Paternal Grandfather      Review of patient's allergies indicates:   Allergen Reactions    Opioids - morphine analogues Itching        Objective:  Vitals:    10/20/22 1026   BP: (!) 171/104   Pulse: (!) 114   Weight: 87.5 kg (193 lb)   Height: 5' 2" (1.575 m)   PainSc:   8         Physical Exam  Vitals and nursing note reviewed. Exam conducted with a chaperone present.   Constitutional:       General: She is awake. She is not in acute distress.     Appearance: Normal appearance. She is not ill-appearing or diaphoretic.   HENT:      Head: Normocephalic and atraumatic.      Nose: Nose normal.      Mouth/Throat:      Mouth: Mucous membranes are moist.      Pharynx: Oropharynx is clear.   Eyes:      Conjunctiva/sclera: Conjunctivae normal.      " Pupils: Pupils are equal, round, and reactive to light.   Cardiovascular:      Rate and Rhythm: Normal rate.   Pulmonary:      Effort: Pulmonary effort is normal. No respiratory distress.   Abdominal:      Palpations: Abdomen is soft.      Tenderness: There is no guarding.   Musculoskeletal:         General: Normal range of motion.      Cervical back: Normal range of motion and neck supple. Tenderness present. No rigidity.      Lumbar back: Tenderness present.   Skin:     General: Skin is warm and dry.      Coloration: Skin is not jaundiced or pale.   Neurological:      General: No focal deficit present.      Mental Status: She is alert and oriented to person, place, and time. Mental status is at baseline.      Cranial Nerves: No cranial nerve deficit (II-XII).   Psychiatric:         Mood and Affect: Mood normal.         Behavior: Behavior normal. Behavior is cooperative.         Thought Content: Thought content normal.         X-Ray Chest PA And Lateral  Narrative: EXAMINATION:  XR CHEST PA AND LATERAL    CLINICAL HISTORY:  Cough, unspecified    COMPARISON:  11/16/2015, 12/17/2021, 05/24/2022, 06/06/2022, and 06/28/2022    FINDINGS:  The cardiac size is normal.  There are diffuse increased interstitial markings in the mid to lower lung zones which is a chronic finding.  No focal areas of consolidation or pleural effusions are seen.  Degenerative changes are present in the midthoracic spine.  Impression: There is chronic interstitial lung disease but no evidence of acute disease.    Place of service: [facility]    Electronically signed by: Jacklyn Ludwig  Date:    09/13/2022  Time:    11:04       Office Visit on 09/13/2022   Component Date Value Ref Range Status    SARS Coronavirus 2 Antigen 09/13/2022 Negative  Negative Final    Rapid Influenza A Ag 09/13/2022 Negative  Negative Final    Rapid Influenza B Ag 09/13/2022 Negative  Negative Final     Acceptable 09/13/2022 Yes   Final    Office Visit on 08/23/2022   Component Date Value Ref Range Status    POC Amphetamines 08/23/2022 Negative  Negative, Inconclusive Final    POC Barbiturates 08/23/2022 Negative  Negative, Inconclusive Final    POC Benzodiazepines 08/23/2022 Negative  Negative, Inconclusive Final    POC Cocaine 08/23/2022 Negative  Negative, Inconclusive Final    POC THC 08/23/2022 Negative  Negative, Inconclusive Final    POC Methadone 08/23/2022 Negative  Negative, Inconclusive Final    POC Methamphetamine 08/23/2022 Negative  Negative, Inconclusive Final    POC Opiates 08/23/2022 Negative  Negative, Inconclusive Final    POC Oxycodone 08/23/2022 Presumptive Positive (A)  Negative, Inconclusive Final    POC Phencyclidine 08/23/2022 Negative  Negative, Inconclusive Final    POC Methylenedioxymethamphetamine * 08/23/2022 Negative  Negative, Inconclusive Final    POC Tricyclic Antidepressants 08/23/2022 Negative  Negative, Inconclusive Final    POC Buprenorphine 08/23/2022 Negative   Final     Acceptable 08/23/2022 Yes   Final    POC Temperature (Urine) 08/23/2022 90   Final   Lab Requisition on 07/14/2022   Component Date Value Ref Range Status    Sodium 07/14/2022 142  136 - 145 mmol/L Final    Potassium 07/14/2022 4.5  3.5 - 5.1 mmol/L Final    Chloride 07/14/2022 107  98 - 107 mmol/L Final    CO2 07/14/2022 28  21 - 32 mmol/L Final    Anion Gap 07/14/2022 12  7 - 16 mmol/L Final    Glucose 07/14/2022 92  74 - 106 mg/dL Final    BUN 07/14/2022 12  7 - 18 mg/dL Final    Creatinine 07/14/2022 1.23 (H)  0.55 - 1.02 mg/dL Final    BUN/Creatinine Ratio 07/14/2022 10  6 - 20 Final    Calcium 07/14/2022 9.9  8.5 - 10.1 mg/dL Final    eGFR 07/14/2022 51 (L)  >=60 mL/min/1.73m² Final    TSH 07/14/2022 1.380  0.358 - 3.740 uIU/mL Final    Total Protein 07/14/2022 7.5  6.4 - 8.2 g/dL Final    Albumin 07/14/2022 3.4 (L)  3.5 - 5.0 g/dL Final    Bilirubin, Total 07/14/2022 0.3  0.0 - 1.2 mg/dL Final    Bilirubin, Direct  07/14/2022 0.1  0.0 - 0.2 mg/dL Final    AST 07/14/2022 19  15 - 37 U/L Final    ALT 07/14/2022 21  13 - 56 U/L Final    Alk Phos 07/14/2022 86  37 - 98 U/L Final    WBC 07/14/2022 9.35  4.50 - 11.00 K/uL Final    RBC 07/14/2022 5.05  4.20 - 5.40 M/uL Final    Hemoglobin 07/14/2022 13.5  12.0 - 16.0 g/dL Final    Hematocrit 07/14/2022 42.5  38.0 - 47.0 % Final    MCV 07/14/2022 84.2  80.0 - 96.0 fL Final    MCH 07/14/2022 26.7 (L)  27.0 - 31.0 pg Final    MCHC 07/14/2022 31.8 (L)  32.0 - 36.0 g/dL Final    RDW 07/14/2022 13.2  11.5 - 14.5 % Final    Platelet Count 07/14/2022 540 (H)  150 - 400 K/uL Final    MPV 07/14/2022 9.7  9.4 - 12.4 fL Final    Neutrophils % 07/14/2022 60.1  53.0 - 65.0 % Final    Lymphocytes % 07/14/2022 23.6 (L)  27.0 - 41.0 % Final    Monocytes % 07/14/2022 12.6 (H)  2.0 - 6.0 % Final    Eosinophils % 07/14/2022 2.8  1.0 - 4.0 % Final    Basophils % 07/14/2022 0.4  0.0 - 1.0 % Final    Immature Granulocytes % 07/14/2022 0.5 (H)  0.0 - 0.4 % Final    nRBC, Auto 07/14/2022 0.0  <=0.0 % Final    Neutrophils, Abs 07/14/2022 5.61  1.80 - 7.70 K/uL Final    Lymphocytes, Absolute 07/14/2022 2.21  1.00 - 4.80 K/uL Final    Monocytes, Absolute 07/14/2022 1.18 (H)  0.00 - 0.80 K/uL Final    Eosinophils, Absolute 07/14/2022 0.26  0.00 - 0.50 K/uL Final    Basophils, Absolute 07/14/2022 0.04  0.00 - 0.20 K/uL Final    Immature Granulocytes, Absolute 07/14/2022 0.05 (H)  0.00 - 0.04 K/uL Final    nRBC, Absolute 07/14/2022 0.00  <=0.00 x10e3/uL Final    Diff Type 07/14/2022 Auto   Final   Admission on 06/06/2022, Discharged on 06/06/2022   Component Date Value Ref Range Status    Sodium 06/06/2022 141  136 - 145 mmol/L Final    Potassium 06/06/2022 4.6  3.5 - 5.1 mmol/L Final    Chloride 06/06/2022 104  98 - 107 mmol/L Final    CO2 06/06/2022 29  21 - 32 mmol/L Final    Anion Gap 06/06/2022 13  7 - 16 mmol/L Final    Glucose 06/06/2022 109 (H)  74 - 106 mg/dL Final    BUN 06/06/2022 8  7 - 18 mg/dL Final     Creatinine 06/06/2022 0.93  0.55 - 1.02 mg/dL Final    BUN/Creatinine Ratio 06/06/2022 9  6 - 20 Final    Calcium 06/06/2022 9.6  8.5 - 10.1 mg/dL Final    Total Protein 06/06/2022 7.8  6.4 - 8.2 g/dL Final    Albumin 06/06/2022 3.4 (L)  3.5 - 5.0 g/dL Final    Globulin 06/06/2022 4.4 (H)  2.0 - 4.0 g/dL Final    A/G Ratio 06/06/2022 0.8   Final    Bilirubin, Total 06/06/2022 0.2  0.0 - 1.2 mg/dL Final    Alk Phos 06/06/2022 91  37 - 98 U/L Final    ALT 06/06/2022 47  13 - 56 U/L Final    AST 06/06/2022 41 (H)  15 - 37 U/L Final    eGFR 06/06/2022 71  >=60 mL/min/1.73m² Final    Influenza A 06/06/2022 Negative  Negative, Invalid Final    Influenza B 06/06/2022 Negative  Negative, Invalid Final    COVID-19 Ag 06/06/2022 Negative  Negative, Invalid Final    D-Dimer 06/06/2022 0.45  0.00 - 0.47 µg/mL Final    Lipase 06/06/2022 137  73 - 393 U/L Final    WBC 06/06/2022 7.12  4.50 - 11.00 K/uL Final    RBC 06/06/2022 5.05  4.20 - 5.40 M/uL Final    Hemoglobin 06/06/2022 13.8  12.0 - 16.0 g/dL Final    Hematocrit 06/06/2022 42.9  38.0 - 47.0 % Final    MCV 06/06/2022 85.0  80.0 - 96.0 fL Final    MCH 06/06/2022 27.3  27.0 - 31.0 pg Final    MCHC 06/06/2022 32.2  32.0 - 36.0 g/dL Final    RDW 06/06/2022 14.4  11.5 - 14.5 % Final    Platelet Count 06/06/2022 377  150 - 400 K/uL Final    MPV 06/06/2022 9.1 (L)  9.4 - 12.4 fL Final    Neutrophils % 06/06/2022 59.5  53.0 - 65.0 % Final    Lymphocytes % 06/06/2022 25.8 (L)  27.0 - 41.0 % Final    Neutrophils, Abs 06/06/2022 4.23  1.80 - 7.70 K/uL Final    Lymphocytes, Absolute 06/06/2022 1.84  1.00 - 4.80 K/uL Final    Diff Type 06/06/2022 Auto   Final    Monocytes % 06/06/2022 11.8 (H)  2.0 - 6.0 % Final    Eosinophils % 06/06/2022 2.5  1.0 - 4.0 % Final    Basophils % 06/06/2022 0.1  0.0 - 1.0 % Final    Immature Granulocytes % 06/06/2022 0.3  0.0 - 0.4 % Final    Monocytes, Absolute 06/06/2022 0.84 (H)  0.00 - 0.80 K/uL Final    Eosinophils, Absolute 06/06/2022 0.18  0.00  - 0.50 K/uL Final    Immature Granulocytes, Absolute 06/06/2022 0.02  0.00 - 0.04 K/uL Final    Basophils, Absolute 06/06/2022 0.01  0.00 - 0.20 K/uL Final    ProBNP 06/06/2022 123  1 - 125 pg/mL Final   Appointment on 06/02/2022   Component Date Value Ref Range Status    SARS Coronavirus 2 Antigen 06/02/2022 Negative  Negative Final     Acceptable 06/02/2022 Yes   Final         No orders of the defined types were placed in this encounter.      Requested Prescriptions     Signed Prescriptions Disp Refills    oxyCODONE-acetaminophen (PERCOCET) 7.5-325 mg per tablet 90 tablet 0     Sig: Take 1 tablet by mouth every 8 (eight) hours.    oxyCODONE-acetaminophen (PERCOCET) 7.5-325 mg per tablet 90 tablet 0     Sig: Take 1 tablet by mouth every 8 (eight) hours.    rOPINIRole (REQUIP) 0.25 MG tablet 90 tablet 1     Sig: Take 1 tablet (0.25 mg total) by mouth every 8 (eight) hours.       Assessment:     1. Polymyositis with myopathy    2. Idiopathic chronic pancreatitis    3. Restless leg syndrome         A's of Opioid Risk Assessment  Activity:Patient can perform ADL.   Analgesia:Patients pain is partially controlled by current medication. Patient has tried OTC medications such as Tylenol and Ibuprofen with out relief.   Adverse Effects: Patient denies constipation or sedation.  Aberrant Behavior:  reviewed with no aberrant drug seeking/taking behavior.  Overdose reversal drug naloxone discussed    Drug screen reviewed      Plan:    Pharmacy closed on weekends    Following rheumatology, [place], IV therapy, polymyositis progressing    Pulmonary fibrosis continuous nasal cannula O2 due to shortness of breath    Chronic pancreatitis, polymyositis    Currently being evaluated new lesion long    She would like to continue conservative management    Continue home exercise program as directed    Continue current medication    Follow-up 2 months    Dr. Matthew, February 2022    Bring original prescription  medication bottles/container/box with labels to each visit    Pill count    Physical therapy    Massage therapy declines

## 2022-12-08 NOTE — PROGRESS NOTES
Subjective:       Patient ID: Alanna Briggs is a 42 y.o. female.    Chief Complaint: Cough (Pt states that she saw her RA doctor yesterday and they informed her that she may lung infection but did not give her anything. Only reny blood and did not do a chest xrays.)    Pt. Saw her specialists who found a pneumonia. No one would give her antibiotics.    Cough  Pertinent negatives include no chest pain, fever, headaches or shortness of breath. There is no history of environmental allergies.   Review of Systems   Constitutional:  Negative for fatigue and fever.   HENT:  Negative for nasal congestion and dental problem.    Eyes:  Negative for discharge.   Respiratory:  Positive for cough. Negative for shortness of breath.    Cardiovascular:  Negative for chest pain.   Gastrointestinal:  Negative for constipation, diarrhea, nausea and vomiting.   Genitourinary:  Negative for bladder incontinence, difficulty urinating and hot flashes.   Allergic/Immunologic: Negative for environmental allergies.   Neurological:  Negative for headaches.   Psychiatric/Behavioral:  Negative for behavioral problems and confusion.        Objective:      Physical Exam  Vitals and nursing note reviewed.   Constitutional:       Appearance: Normal appearance. She is normal weight.   HENT:      Head: Normocephalic and atraumatic.      Right Ear: Tympanic membrane normal.      Left Ear: Tympanic membrane normal.      Nose: Nose normal.      Mouth/Throat:      Mouth: Mucous membranes are moist.   Eyes:      Extraocular Movements: Extraocular movements intact.      Conjunctiva/sclera: Conjunctivae normal.      Pupils: Pupils are equal, round, and reactive to light.   Cardiovascular:      Rate and Rhythm: Normal rate and regular rhythm.      Pulses: Normal pulses.   Pulmonary:      Effort: Pulmonary effort is normal.      Breath sounds: Normal breath sounds.      Comments: Coarse breath sounds bilaterally - worse on the right  Abdominal:       General: Abdomen is flat. Bowel sounds are normal.      Palpations: Abdomen is soft.   Musculoskeletal:         General: Normal range of motion.      Cervical back: Normal range of motion and neck supple.   Skin:     General: Skin is warm and dry.   Neurological:      General: No focal deficit present.      Mental Status: She is alert and oriented to person, place, and time.   Psychiatric:         Mood and Affect: Mood normal.       Assessment:       Problem List Items Addressed This Visit          Pulmonary    Cough - Primary    Relevant Orders    X-Ray Chest PA And Lateral    Pulmonary fibrosis       Immunology/Multi System    Polymyositis     Other Visit Diagnoses       Fever, unspecified fever cause        Relevant Orders    X-Ray Chest PA And Lateral    POCT SARS-COV2 (COVID) with Flu Antigen    Pneumonia of right lower lobe due to infectious organism        Relevant Medications    cefTRIAXone injection 1 g (Start on 12/8/2022  4:00 PM)    betamethasone acetate-betamethasone sodium phosphate injection 6 mg (Start on 12/8/2022  4:00 PM)    amoxicillin-clavulanate 500-125mg (AUGMENTIN) 500-125 mg Tab    fluconazole (DIFLUCAN) 150 MG Tab              Plan:       RTC 1 week

## 2022-12-21 NOTE — PROGRESS NOTES
Subjective:         Patient ID: Alanna Briggs is a 42 y.o. female.    Chief Complaint: Back Pain        Pain  This is a chronic problem. The current episode started more than 1 year ago. The problem occurs daily. The problem has been waxing and waning. Associated symptoms include abdominal pain, arthralgias and neck pain. Pertinent negatives include no anorexia, change in bowel habit, chills, coughing, fever, sore throat or vertigo.   Review of Systems   Constitutional:  Negative for activity change, appetite change, chills, fever and unexpected weight change.   HENT:  Negative for drooling, ear discharge, ear pain, facial swelling, nosebleeds, sore throat, trouble swallowing, voice change and goiter.    Eyes:  Negative for photophobia, pain, discharge, redness and visual disturbance.   Respiratory:  Negative for apnea, cough, choking, chest tightness, shortness of breath, wheezing and stridor.    Cardiovascular:  Negative for palpitations and leg swelling.   Gastrointestinal:  Positive for abdominal pain. Negative for abdominal distention, anorexia, change in bowel habit, rectal pain, fecal incontinence and change in bowel habit.   Endocrine: Negative for cold intolerance, heat intolerance, polydipsia, polyphagia and polyuria.   Genitourinary:  Negative for flank pain and hot flashes.   Musculoskeletal:  Positive for arthralgias, back pain and neck pain.   Integumentary:  Negative for color change and pallor.   Allergic/Immunologic: Negative for immunocompromised state.   Neurological:  Negative for dizziness, vertigo, seizures, syncope, facial asymmetry, speech difficulty, light-headedness, coordination difficulties, memory loss and coordination difficulties.   Hematological:  Negative for adenopathy. Does not bruise/bleed easily.   Psychiatric/Behavioral:  Negative for agitation, behavioral problems, confusion, decreased concentration, dysphoric mood, hallucinations, self-injury and suicidal ideas. The  "patient is not nervous/anxious and is not hyperactive.          Past Medical History:   Diagnosis Date    Anemia     Anxiety     Chronic gastritis     Chronic pain syndrome     Hypertension     Hypokalemia     Migraine     Other chronic pancreatitis     Polymyositis     Polymyositis with myopathy     Pulmonary fibrosis     Pulmonary fibrosis     Vitamin D deficiency      Past Surgical History:   Procedure Laterality Date     SECTION      CHOLECYSTECTOMY      COLONOSCOPY      ESOPHAGOGASTRODUODENOSCOPY      ESOPHAGOGASTRODUODENOSCOPY      LUNG BIOPSY       Social History     Socioeconomic History    Marital status: Single   Tobacco Use    Smoking status: Never    Smokeless tobacco: Never   Substance and Sexual Activity    Alcohol use: Not Currently    Drug use: Yes     Types: Oxycodone     Comment: Prescribed by Dr Hood for chronic pain at [facility]    Sexual activity: Yes     Family History   Problem Relation Age of Onset    Hypertension Mother     Heart disease Father     Cancer Maternal Grandmother     Stroke Maternal Grandmother     Arthritis Paternal Grandmother     Stroke Paternal Grandmother     Cancer Paternal Grandfather      Review of patient's allergies indicates:   Allergen Reactions    Opioids - morphine analogues Itching        Objective:  Vitals:    22 1021   BP: (!) 148/96   Pulse: 101   Resp: 20   Weight: 87.5 kg (193 lb)   Height: 5' 6" (1.676 m)   PainSc:   8         Physical Exam  Vitals and nursing note reviewed. Exam conducted with a chaperone present.   Constitutional:       General: She is awake. She is not in acute distress.     Appearance: Normal appearance. She is not ill-appearing or diaphoretic.   HENT:      Head: Normocephalic and atraumatic.      Nose: Nose normal.      Mouth/Throat:      Mouth: Mucous membranes are moist.      Pharynx: Oropharynx is clear.   Eyes:      Conjunctiva/sclera: Conjunctivae normal.      Pupils: Pupils are equal, round, and reactive to " light.   Cardiovascular:      Rate and Rhythm: Normal rate.   Pulmonary:      Effort: Pulmonary effort is normal. No respiratory distress.   Abdominal:      Palpations: Abdomen is soft.      Tenderness: There is no guarding.   Musculoskeletal:         General: Normal range of motion.      Cervical back: Normal range of motion and neck supple. Tenderness present. No rigidity.      Lumbar back: Tenderness present.   Skin:     General: Skin is warm and dry.      Coloration: Skin is not jaundiced or pale.   Neurological:      General: No focal deficit present.      Mental Status: She is alert and oriented to person, place, and time. Mental status is at baseline.      Cranial Nerves: No cranial nerve deficit (II-XII).   Psychiatric:         Mood and Affect: Mood normal.         Behavior: Behavior normal. Behavior is cooperative.         Thought Content: Thought content normal.         X-Ray Chest PA And Lateral  Narrative: EXAMINATION:  XR CHEST PA AND LATERAL    CLINICAL HISTORY:  Cough, unspecified    COMPARISON:  11/16/2015, 05/24/2022, 06/06/2022, 06/28/2022, and 09/13/2022    FINDINGS:  There is cardiomegaly.  Increased interstitial markings are present in the lung bases bilaterally with a more focal area of consolidation seen in the right lower lobe.  This finding may represent pneumonia.  No pleural effusion is seen.    Degenerative changes are present in the thoracic spine.  Impression: The patient has chronic bibasilar interstitial lung disease but there is additional consolidation in the right lower lobe that may represent superimposed pneumonia.    [facility] of [facility]    Electronically signed by: Jacklyn Ludwig  Date:    12/08/2022  Time:    16:25         Office Visit on 12/08/2022   Component Date Value Ref Range Status    SARS Coronavirus 2 Antigen 12/08/2022 Negative  Negative Final    Rapid Influenza A Ag 12/08/2022 Negative  Negative Final    Rapid Influenza B Ag 12/08/2022 Negative   Negative Final     Acceptable 12/08/2022 Yes   Final   Orders Only on 10/27/2022   Component Date Value Ref Range Status    PAP Recommendation External 10/20/2020 Pap in 5 years   Final   Office Visit on 09/13/2022   Component Date Value Ref Range Status    SARS Coronavirus 2 Antigen 09/13/2022 Negative  Negative Final    Rapid Influenza A Ag 09/13/2022 Negative  Negative Final    Rapid Influenza B Ag 09/13/2022 Negative  Negative Final     Acceptable 09/13/2022 Yes   Final   Office Visit on 08/23/2022   Component Date Value Ref Range Status    POC Amphetamines 08/23/2022 Negative  Negative, Inconclusive Final    POC Barbiturates 08/23/2022 Negative  Negative, Inconclusive Final    POC Benzodiazepines 08/23/2022 Negative  Negative, Inconclusive Final    POC Cocaine 08/23/2022 Negative  Negative, Inconclusive Final    POC THC 08/23/2022 Negative  Negative, Inconclusive Final    POC Methadone 08/23/2022 Negative  Negative, Inconclusive Final    POC Methamphetamine 08/23/2022 Negative  Negative, Inconclusive Final    POC Opiates 08/23/2022 Negative  Negative, Inconclusive Final    POC Oxycodone 08/23/2022 Presumptive Positive (A)  Negative, Inconclusive Final    POC Phencyclidine 08/23/2022 Negative  Negative, Inconclusive Final    POC Methylenedioxymethamphetamine * 08/23/2022 Negative  Negative, Inconclusive Final    POC Tricyclic Antidepressants 08/23/2022 Negative  Negative, Inconclusive Final    POC Buprenorphine 08/23/2022 Negative   Final     Acceptable 08/23/2022 Yes   Final    POC Temperature (Urine) 08/23/2022 90   Final   Lab Requisition on 07/14/2022   Component Date Value Ref Range Status    Sodium 07/14/2022 142  136 - 145 mmol/L Final    Potassium 07/14/2022 4.5  3.5 - 5.1 mmol/L Final    Chloride 07/14/2022 107  98 - 107 mmol/L Final    CO2 07/14/2022 28  21 - 32 mmol/L Final    Anion Gap 07/14/2022 12  7 - 16 mmol/L Final    Glucose 07/14/2022 92  74 - 106  mg/dL Final    BUN 07/14/2022 12  7 - 18 mg/dL Final    Creatinine 07/14/2022 1.23 (H)  0.55 - 1.02 mg/dL Final    BUN/Creatinine Ratio 07/14/2022 10  6 - 20 Final    Calcium 07/14/2022 9.9  8.5 - 10.1 mg/dL Final    eGFR 07/14/2022 51 (L)  >=60 mL/min/1.73m² Final    TSH 07/14/2022 1.380  0.358 - 3.740 uIU/mL Final    Total Protein 07/14/2022 7.5  6.4 - 8.2 g/dL Final    Albumin 07/14/2022 3.4 (L)  3.5 - 5.0 g/dL Final    Bilirubin, Total 07/14/2022 0.3  0.0 - 1.2 mg/dL Final    Bilirubin, Direct 07/14/2022 0.1  0.0 - 0.2 mg/dL Final    AST 07/14/2022 19  15 - 37 U/L Final    ALT 07/14/2022 21  13 - 56 U/L Final    Alk Phos 07/14/2022 86  37 - 98 U/L Final    WBC 07/14/2022 9.35  4.50 - 11.00 K/uL Final    RBC 07/14/2022 5.05  4.20 - 5.40 M/uL Final    Hemoglobin 07/14/2022 13.5  12.0 - 16.0 g/dL Final    Hematocrit 07/14/2022 42.5  38.0 - 47.0 % Final    MCV 07/14/2022 84.2  80.0 - 96.0 fL Final    MCH 07/14/2022 26.7 (L)  27.0 - 31.0 pg Final    MCHC 07/14/2022 31.8 (L)  32.0 - 36.0 g/dL Final    RDW 07/14/2022 13.2  11.5 - 14.5 % Final    Platelet Count 07/14/2022 540 (H)  150 - 400 K/uL Final    MPV 07/14/2022 9.7  9.4 - 12.4 fL Final    Neutrophils % 07/14/2022 60.1  53.0 - 65.0 % Final    Lymphocytes % 07/14/2022 23.6 (L)  27.0 - 41.0 % Final    Monocytes % 07/14/2022 12.6 (H)  2.0 - 6.0 % Final    Eosinophils % 07/14/2022 2.8  1.0 - 4.0 % Final    Basophils % 07/14/2022 0.4  0.0 - 1.0 % Final    Immature Granulocytes % 07/14/2022 0.5 (H)  0.0 - 0.4 % Final    nRBC, Auto 07/14/2022 0.0  <=0.0 % Final    Neutrophils, Abs 07/14/2022 5.61  1.80 - 7.70 K/uL Final    Lymphocytes, Absolute 07/14/2022 2.21  1.00 - 4.80 K/uL Final    Monocytes, Absolute 07/14/2022 1.18 (H)  0.00 - 0.80 K/uL Final    Eosinophils, Absolute 07/14/2022 0.26  0.00 - 0.50 K/uL Final    Basophils, Absolute 07/14/2022 0.04  0.00 - 0.20 K/uL Final    Immature Granulocytes, Absolute 07/14/2022 0.05 (H)  0.00 - 0.04 K/uL Final    nRBC, Absolute  2022 0.00  <=0.00 x10e3/uL Final    Diff Type 2022 Auto   Final         Orders Placed This Encounter   Procedures    POCT Urine Drug Screen Presump     Interpretive Information:     Negative:  No drug detected at the cut off level.   Positive:  This result represents presumptive positive for the   tested drug, other substances may yield a positive response other   than the analyte of interest. This result should be utilized for   diagnostic purpose only. Confirmation testing will be performed upon physician request only.            Requested Prescriptions     Signed Prescriptions Disp Refills    oxyCODONE-acetaminophen (PERCOCET) 7.5-325 mg per tablet 90 tablet 0     Sig: Take 1 tablet by mouth every 8 (eight) hours.    oxyCODONE-acetaminophen (PERCOCET) 7.5-325 mg per tablet 90 tablet 0     Sig: Take 1 tablet by mouth every 8 (eight) hours.    naloxone (NARCAN) 4 mg/actuation Spry 1 each 0     Si spray (4 mg total) by Nasal route once. for 1 dose       Assessment:     1. Generalized abdominal pain    2. Idiopathic chronic pancreatitis    3. Polymyositis with myopathy    4. Encounter for long-term (current) use of other medications         A's of Opioid Risk Assessment  Activity:Patient can perform ADL.   Analgesia:Patients pain is partially controlled by current medication. Patient has tried OTC medications such as Tylenol and Ibuprofen with out relief.   Adverse Effects: Patient denies constipation or sedation.  Aberrant Behavior:  reviewed with no aberrant drug seeking/taking behavior.  Overdose reversal drug naloxone discussed    Drug screen reviewed      Plan:    Narcan 2022    Pharmacy closed on weekends    Following rheumatology, Fitz, IV therapy, polymyositis progressing    Pulmonary fibrosis continuous nasal cannula O2 due to shortness of breath    Chronic pancreatitis, polymyositis    Has developed pulmonary hypertension as heart catheterization pending    She would like  to continue conservative management    Continue home exercise program as directed    Continue current medication    Follow-up 2 months    Dr. Matthew, February 2022    Bring original prescription medication bottles/container/box with labels to each visit    Pill count    Physical therapy    Massage therapy declines

## 2023-01-01 ENCOUNTER — APPOINTMENT (OUTPATIENT)
Dept: RADIOLOGY | Facility: CLINIC | Age: 43
End: 2023-01-01
Attending: FAMILY MEDICINE
Payer: MEDICARE

## 2023-01-01 ENCOUNTER — EXTERNAL CHRONIC CARE MANAGEMENT (OUTPATIENT)
Dept: PRIMARY CARE CLINIC | Facility: CLINIC | Age: 43
End: 2023-01-01
Payer: MEDICARE

## 2023-01-01 ENCOUNTER — OFFICE VISIT (OUTPATIENT)
Dept: PRIMARY CARE CLINIC | Facility: CLINIC | Age: 43
End: 2023-01-01
Payer: MEDICARE

## 2023-01-01 ENCOUNTER — TELEPHONE (OUTPATIENT)
Dept: PRIMARY CARE CLINIC | Facility: CLINIC | Age: 43
End: 2023-01-01
Payer: MEDICARE

## 2023-01-01 VITALS
SYSTOLIC BLOOD PRESSURE: 114 MMHG | HEART RATE: 110 BPM | RESPIRATION RATE: 22 BRPM | HEIGHT: 66 IN | DIASTOLIC BLOOD PRESSURE: 72 MMHG | TEMPERATURE: 99 F | WEIGHT: 190 LBS | BODY MASS INDEX: 30.53 KG/M2 | OXYGEN SATURATION: 95 %

## 2023-01-01 DIAGNOSIS — R05.9 COUGH, UNSPECIFIED TYPE: Primary | ICD-10-CM

## 2023-01-01 DIAGNOSIS — R06.02 SHORTNESS OF BREATH: ICD-10-CM

## 2023-01-01 DIAGNOSIS — M33.20 POLYMYOSITIS: ICD-10-CM

## 2023-01-01 DIAGNOSIS — K86.1 IDIOPATHIC CHRONIC PANCREATITIS: Chronic | ICD-10-CM

## 2023-01-01 DIAGNOSIS — I27.20 PULMONARY HYPERTENSION: ICD-10-CM

## 2023-01-01 DIAGNOSIS — M19.90 ARTHRITIS: ICD-10-CM

## 2023-01-01 DIAGNOSIS — R05.9 COUGH, UNSPECIFIED TYPE: ICD-10-CM

## 2023-01-01 DIAGNOSIS — M33.22 POLYMYOSITIS WITH MYOPATHY: Chronic | ICD-10-CM

## 2023-01-01 DIAGNOSIS — R53.83 FATIGUE, UNSPECIFIED TYPE: ICD-10-CM

## 2023-01-01 DIAGNOSIS — G43.009 MIGRAINE WITHOUT AURA AND WITHOUT STATUS MIGRAINOSUS, NOT INTRACTABLE: ICD-10-CM

## 2023-01-01 LAB
ALBUMIN SERPL BCP-MCNC: 3 G/DL (ref 3.5–5)
ALBUMIN/GLOB SERPL: 0.7 {RATIO}
ALP SERPL-CCNC: 91 U/L (ref 37–98)
ALT SERPL W P-5'-P-CCNC: 13 U/L (ref 13–56)
ANION GAP SERPL CALCULATED.3IONS-SCNC: 14 MMOL/L (ref 7–16)
AST SERPL W P-5'-P-CCNC: 23 U/L (ref 15–37)
BASOPHILS # BLD AUTO: 0.03 K/UL (ref 0–0.2)
BASOPHILS NFR BLD AUTO: 0.2 % (ref 0–1)
BILIRUB SERPL-MCNC: 0.4 MG/DL (ref ?–1.2)
BUN SERPL-MCNC: 17 MG/DL (ref 7–18)
BUN/CREAT SERPL: 15 (ref 6–20)
CALCIUM SERPL-MCNC: 10 MG/DL (ref 8.5–10.1)
CHLORIDE SERPL-SCNC: 103 MMOL/L (ref 98–107)
CO2 SERPL-SCNC: 28 MMOL/L (ref 21–32)
CREAT SERPL-MCNC: 1.1 MG/DL (ref 0.55–1.02)
DIFFERENTIAL METHOD BLD: ABNORMAL
EGFR (NO RACE VARIABLE) (RUSH/TITUS): 65 ML/MIN/1.73M²
EOSINOPHIL # BLD AUTO: 0 K/UL (ref 0–0.5)
EOSINOPHIL NFR BLD AUTO: 0 % (ref 1–4)
ERYTHROCYTE [DISTWIDTH] IN BLOOD BY AUTOMATED COUNT: 15.5 % (ref 11.5–14.5)
GLOBULIN SER-MCNC: 4.3 G/DL (ref 2–4)
GLUCOSE SERPL-MCNC: 87 MG/DL (ref 74–106)
HCT VFR BLD AUTO: 39.4 % (ref 38–47)
HGB BLD-MCNC: 11.5 G/DL (ref 12–16)
IMM GRANULOCYTES # BLD AUTO: 0.2 K/UL (ref 0–0.04)
IMM GRANULOCYTES NFR BLD: 1.1 % (ref 0–0.4)
LYMPHOCYTES # BLD AUTO: 1.23 K/UL (ref 1–4.8)
LYMPHOCYTES NFR BLD AUTO: 6.7 % (ref 27–41)
MCH RBC QN AUTO: 25.1 PG (ref 27–31)
MCHC RBC AUTO-ENTMCNC: 29.2 G/DL (ref 32–36)
MCV RBC AUTO: 85.8 FL (ref 80–96)
MONOCYTES # BLD AUTO: 2.15 K/UL (ref 0–0.8)
MONOCYTES NFR BLD AUTO: 11.7 % (ref 2–6)
MPC BLD CALC-MCNC: 9.9 FL (ref 9.4–12.4)
NEUTROPHILS # BLD AUTO: 14.81 K/UL (ref 1.8–7.7)
NEUTROPHILS NFR BLD AUTO: 80.3 % (ref 53–65)
NRBC # BLD AUTO: 0 X10E3/UL
NRBC, AUTO (.00): 0 %
PLATELET # BLD AUTO: 653 K/UL (ref 150–400)
POTASSIUM SERPL-SCNC: 4.2 MMOL/L (ref 3.5–5.1)
PROT SERPL-MCNC: 7.3 G/DL (ref 6.4–8.2)
RBC # BLD AUTO: 4.59 M/UL (ref 4.2–5.4)
SODIUM SERPL-SCNC: 141 MMOL/L (ref 136–145)
WBC # BLD AUTO: 18.42 K/UL (ref 4.5–11)

## 2023-01-01 PROCEDURE — 96372 PR INJECTION,THERAP/PROPH/DIAG2ST, IM OR SUBCUT: ICD-10-PCS | Mod: ,,, | Performed by: FAMILY MEDICINE

## 2023-01-01 PROCEDURE — G0511 CCM/BHI BY RHC/FQHC 20MIN MO: HCPCS | Mod: ,,, | Performed by: FAMILY MEDICINE

## 2023-01-01 PROCEDURE — 71046 X-RAY EXAM CHEST 2 VIEWS: CPT | Mod: TC,RHCUB | Performed by: FAMILY MEDICINE

## 2023-01-01 PROCEDURE — G0511 PR CHRONIC CARE MGMT, RHC OR FQHC ONLY, 20 MINS OR MORE: ICD-10-PCS | Mod: ,,, | Performed by: FAMILY MEDICINE

## 2023-01-01 PROCEDURE — 71046 X-RAY EXAM CHEST 2 VIEWS: CPT | Mod: 26,,, | Performed by: RADIOLOGY

## 2023-01-01 PROCEDURE — 71046 XR CHEST PA AND LATERAL: ICD-10-PCS | Mod: 26,,, | Performed by: RADIOLOGY

## 2023-01-01 PROCEDURE — 80053 COMPREHEN METABOLIC PANEL: CPT | Mod: ,,, | Performed by: CLINICAL MEDICAL LABORATORY

## 2023-01-01 PROCEDURE — 99214 OFFICE O/P EST MOD 30 MIN: CPT | Mod: ,,, | Performed by: FAMILY MEDICINE

## 2023-01-01 PROCEDURE — 85025 COMPLETE CBC W/AUTO DIFF WBC: CPT | Mod: ,,, | Performed by: CLINICAL MEDICAL LABORATORY

## 2023-01-01 PROCEDURE — 96372 THER/PROPH/DIAG INJ SC/IM: CPT | Mod: ,,, | Performed by: FAMILY MEDICINE

## 2023-01-01 PROCEDURE — 80053 COMPREHENSIVE METABOLIC PANEL: ICD-10-PCS | Mod: ,,, | Performed by: CLINICAL MEDICAL LABORATORY

## 2023-01-01 PROCEDURE — 99214 PR OFFICE/OUTPT VISIT, EST, LEVL IV, 30-39 MIN: ICD-10-PCS | Mod: ,,, | Performed by: FAMILY MEDICINE

## 2023-01-01 PROCEDURE — 85025 CBC WITH DIFFERENTIAL: ICD-10-PCS | Mod: ,,, | Performed by: CLINICAL MEDICAL LABORATORY

## 2023-01-01 RX ORDER — MACITENTAN 10 MG/1
10 TABLET, FILM COATED ORAL
COMMUNITY
Start: 2023-01-01

## 2023-01-01 RX ORDER — OXYCODONE AND ACETAMINOPHEN 7.5; 325 MG/1; MG/1
1 TABLET ORAL EVERY 8 HOURS
Qty: 90 TABLET | Refills: 0 | Status: SHIPPED | OUTPATIENT
Start: 2023-01-01

## 2023-01-01 RX ORDER — UMECLIDINIUM BROMIDE AND VILANTEROL TRIFENATATE 62.5; 25 UG/1; UG/1
1 POWDER RESPIRATORY (INHALATION)
COMMUNITY
Start: 2022-01-01

## 2023-01-01 RX ORDER — KETOROLAC TROMETHAMINE 30 MG/ML
60 INJECTION, SOLUTION INTRAMUSCULAR; INTRAVENOUS
Status: COMPLETED | OUTPATIENT
Start: 2023-01-01 | End: 2023-01-01

## 2023-01-01 RX ORDER — PREDNISONE 20 MG/1
TABLET ORAL
COMMUNITY
Start: 2022-01-01

## 2023-01-01 RX ORDER — PROMETHAZINE HYDROCHLORIDE 25 MG/ML
25 INJECTION, SOLUTION INTRAMUSCULAR; INTRAVENOUS
Status: COMPLETED | OUTPATIENT
Start: 2023-01-01 | End: 2023-01-01

## 2023-01-01 RX ADMIN — KETOROLAC TROMETHAMINE 60 MG: 30 INJECTION, SOLUTION INTRAMUSCULAR; INTRAVENOUS at 10:02

## 2023-01-01 RX ADMIN — PROMETHAZINE HYDROCHLORIDE 25 MG: 25 INJECTION, SOLUTION INTRAMUSCULAR; INTRAVENOUS at 10:02

## 2023-01-17 NOTE — TELEPHONE ENCOUNTER
----- Message from Love Medel sent at 1/17/2023  1:45 PM CST -----  Regarding: rx  Pt states her rx is due to be picked up on 01-21-23 pt states her pharmacy will be closed on that date wants to know can meds be picked up sooner please call pt back at [phone]  ANNETTE SALDANA   01/17/2023   2:49 PM   Will change  date to 1/20/23

## 2023-02-07 PROBLEM — I27.20 PULMONARY HYPERTENSION: Status: ACTIVE | Noted: 2023-01-01

## 2023-02-07 PROBLEM — G43.009 MIGRAINE WITHOUT AURA AND WITHOUT STATUS MIGRAINOSUS, NOT INTRACTABLE: Status: ACTIVE | Noted: 2023-01-01

## 2023-02-07 NOTE — PROGRESS NOTES
Subjective:       Patient ID: Alanna Briggs is a 42 y.o. female.    Chief Complaint: Migraine (/), Shortness of Breath (/), Cough, and Fatigue    Pt. Had a heart cath last week. Diagnosed with pulmonary hypertension. Got another Rituxan treatment. To see pulmonologist next week.    Migraine   Associated symptoms include coughing and nausea. Pertinent negatives include no fever or vomiting.   Shortness of Breath  Associated symptoms include headaches. Pertinent negatives include no chest pain, fever or vomiting.   Cough  Associated symptoms include headaches and shortness of breath. Pertinent negatives include no chest pain or fever. There is no history of environmental allergies.   Fatigue  Associated symptoms include coughing, fatigue, headaches and nausea. Pertinent negatives include no chest pain, congestion, fever or vomiting.   Review of Systems   Constitutional:  Positive for fatigue. Negative for fever.   HENT:  Negative for nasal congestion and dental problem.    Eyes:  Negative for discharge.   Respiratory:  Positive for cough and shortness of breath.    Cardiovascular:  Negative for chest pain.   Gastrointestinal:  Positive for nausea. Negative for constipation, diarrhea and vomiting.   Genitourinary:  Negative for bladder incontinence, difficulty urinating and hot flashes.   Allergic/Immunologic: Negative for environmental allergies.   Neurological:  Positive for headaches.   Psychiatric/Behavioral:  Negative for behavioral problems and confusion.        Objective:      Physical Exam  Vitals and nursing note reviewed.   Constitutional:       Appearance: Normal appearance. She is normal weight.   HENT:      Head: Normocephalic and atraumatic.      Right Ear: Tympanic membrane normal.      Left Ear: Tympanic membrane normal.      Nose: Nose normal.      Mouth/Throat:      Mouth: Mucous membranes are moist.   Eyes:      Extraocular Movements: Extraocular movements intact.      Conjunctiva/sclera:  Conjunctivae normal.      Pupils: Pupils are equal, round, and reactive to light.   Cardiovascular:      Rate and Rhythm: Normal rate and regular rhythm.      Pulses: Normal pulses.   Pulmonary:      Effort: Pulmonary effort is normal.      Breath sounds: Normal breath sounds.      Comments: Coarse breath sounds. On O2  Abdominal:      General: Abdomen is flat. Bowel sounds are normal.      Palpations: Abdomen is soft.   Musculoskeletal:         General: Normal range of motion.      Cervical back: Normal range of motion and neck supple.   Skin:     General: Skin is warm and dry.   Neurological:      General: No focal deficit present.      Mental Status: She is alert and oriented to person, place, and time.   Psychiatric:         Mood and Affect: Mood normal.       Assessment:       Problem List Items Addressed This Visit          Neuro    Migraine without aura and without status migrainosus, not intractable    Relevant Medications    ketorolac injection 60 mg (Start on 2/7/2023 10:00 AM)    promethazine injection 25 mg (Start on 2/7/2023 10:00 AM)       Pulmonary    Cough - Primary    Relevant Orders    X-Ray Chest PA And Lateral    Pulmonary hypertension       Immunology/Multi System    Polymyositis       Orthopedic    Arthritis     Other Visit Diagnoses       Fatigue, unspecified type        Relevant Orders    CBC Auto Differential    Comprehensive Metabolic Panel    Shortness of breath        Relevant Orders    CBC Auto Differential    Comprehensive Metabolic Panel    X-Ray Chest PA And Lateral              Plan:       RTC as needed

## 2023-02-08 NOTE — PROGRESS NOTES
Please get lab to her pulmonologist. Start doxycycline 100mg BID x 10 days if this will mix with her meds

## 2023-02-15 NOTE — TELEPHONE ENCOUNTER
----- Message from Gila Edmond MD sent at 2/8/2023  8:45 AM CST -----  Please get lab to her pulmonologist. Start doxycycline 100mg BID x 10 days if this will mix with her meds